# Patient Record
Sex: FEMALE | Race: BLACK OR AFRICAN AMERICAN | Employment: UNEMPLOYED | URBAN - METROPOLITAN AREA
[De-identification: names, ages, dates, MRNs, and addresses within clinical notes are randomized per-mention and may not be internally consistent; named-entity substitution may affect disease eponyms.]

---

## 2018-07-16 ENCOUNTER — OFFICE VISIT (OUTPATIENT)
Dept: URGENT CARE | Age: 23
End: 2018-07-16

## 2018-07-16 VITALS
WEIGHT: 113 LBS | BODY MASS INDEX: 22.78 KG/M2 | TEMPERATURE: 97.2 F | OXYGEN SATURATION: 98 % | HEART RATE: 87 BPM | RESPIRATION RATE: 18 BRPM | HEIGHT: 59 IN | DIASTOLIC BLOOD PRESSURE: 61 MMHG | SYSTOLIC BLOOD PRESSURE: 109 MMHG

## 2018-07-16 DIAGNOSIS — L50.9 HIVES: Primary | ICD-10-CM

## 2018-07-16 DIAGNOSIS — Z32.00 POSSIBLE PREGNANCY: ICD-10-CM

## 2018-07-16 LAB
HCG URINE, QL. (POC): NEGATIVE
VALID INTERNAL CONTROL?: YES

## 2018-07-16 RX ORDER — TRIAMCINOLONE ACETONIDE 1 MG/G
CREAM TOPICAL 2 TIMES DAILY
Qty: 45 G | Refills: 0 | Status: SHIPPED | OUTPATIENT
Start: 2018-07-16 | End: 2018-07-23

## 2018-07-16 NOTE — MR AVS SNAPSHOT
[address] [facility] [address] 
[phone] Patient: Pierre Taylor MRN: [id number] :1995 Visit Information Date & Time Provider Department Dept. Phone Encounter #  
 2018  2:00 PM [address] [facility] [phone] [id number] Upcoming Health Maintenance Date Due  
 HPV Age 9Y-34Y (1 of 1 - Female 3 Dose Series) 2004 DTaP/Tdap/Td series (1 - Tdap) 2014 PAP AKA CERVICAL CYTOLOGY 2014 Influenza Age 5 to Adult 2018 Allergies as of 2018  Review Complete On: 2018 By: Eliz Riggins MD  
 No Known Allergies Current Immunizations  Never Reviewed No immunizations on file. Not reviewed this visit You Were Diagnosed With   
  
 Codes Comments Hives    -  Primary ICD-10-CM: L50.9 ICD-9-CM: 708.9 Possible pregnancy     ICD-10-CM: Z32.00 ICD-9-CM: V72.40 Vitals BP Pulse Temp Resp Height(growth percentile) Weight(growth percentile) 109/61 87 97.2 °F (36.2 °C) 18 4' 11\" (1.499 m) 113 lb (51.3 kg) LMP SpO2 BMI OB Status Smoking Status 2018 98% 22.82 kg/m2 Having regular periods Never Smoker BMI and BSA Data Body Mass Index Body Surface Area  
 22.82 kg/m 2 1.46 m 2 Preferred Pharmacy Pharmacy Name Phone [id number] [address] [phone] Your Updated Medication List  
  
   
This list is accurate as of 18  2:14 PM.  Always use your most recent med list.  
  
  
  
  
 triamcinolone acetonide 0.1 % topical cream  
Commonly known as:  KENALOG Apply  to affected area two (2) times a day for 7 days. use thin layer Prescriptions Sent to Pharmacy Refills  
 triamcinolone acetonide (KENALOG) 0.1 % topical cream 0 Sig: Apply  to affected area two (2) times a day for 7 days. use thin layer  Class: Normal  
 Pharmacy: [facility]/pharmacy #[id number] - [address] #: [phone] Route: Topical  
  
We Performed the Following AMB POC URINE PREGNANCY TEST, VISUAL COLOR COMPARISON [28611 CPT(R)] Patient Instructions Hives: Care Instructions Your Care Instructions Hives are raised, red, itchy patches of skin. They are also called wheals or welts. They usually have red borders and pale centers. Hives range in size from ¼ inch to 3 inches or more across. They may seem to move from place to place on the skin. Several hives may form a large area of raised, red skin. You can get hives after an insect sting, after taking medicine or eating certain foods, or because of infection or stress. Other causes include plants, things you breathe in, makeup, heat, cold, sunlight, and latex. You cannot spread hives to other people. Hives may last a few minutes or a few days, but a single spot may last less than 36 hours. Follow-up care is a key part of your treatment and safety. Be sure to make and go to all appointments, and call your doctor if you are having problems. It's also a good idea to know your test results and keep a list of the medicines you take. How can you care for yourself at home? · Avoid whatever you think may have caused your hives, such as a certain food or medicine. However, you may not know the cause. · Put a cool, wet towel on the area to relieve itching. · Take an over-the-counter antihistamine, such as diphenhydramine (Benadryl), cetirizine (Zyrtec), or loratadine (Claritin), to help stop the hives and calm the itching. Read and follow directions on the label. These medicines can make you feel sleepy. Do not drive while using them. · Stay away from strong soaps, detergents, and chemicals. These can make itching worse. When should you call for help? Call 911 anytime you think you may need emergency care. For example, call if:   · You have symptoms of a severe allergic reaction. These may include: 
¨ Sudden raised, red areas (hives) all over your body. ¨ Swelling of the throat, mouth, lips, or tongue. ¨ Trouble breathing. ¨ Passing out (losing consciousness). Or you may feel very lightheaded or suddenly feel weak, confused, or restless.  
 Call your doctor now or seek immediate medical care if: 
  · You have symptoms of an allergic reaction, such as: ¨ A rash or hives (raised, red areas on the skin). ¨ Itching. ¨ Swelling. ¨ Belly pain, nausea, or vomiting.  
  · You get hives after you start a new medicine.  
  · Hives have not gone away after 24 hours.  
 Watch closely for changes in your health, and be sure to contact your doctor if: 
  · You do not get better as expected. Where can you learn more? Go to http://hudson-marietta.info/. Enter A791 in the search box to learn more about \"Hives: Care Instructions. \" Current as of: November 20, 2017 Content Version: 11.7 © 1380-2199 [employer]. Care instructions adapted under license by [employer] (which disclaims liability or warranty for this information). If you have questions about a medical condition or this instruction, always ask your healthcare professional. [address] any warranty or liability for your use of this information. Introducing [facility]! Jimmy Madison introduces [employer] patient portal. Now you can access parts of your medical record, email your doctor's office, and request medication refills online. 1. In your internet browser, go to [email or web address] 2. Click on the First Time User? Click Here link in the Sign In box. You will see the New Member Sign Up page. 3. Enter your [employer] Access Code exactly as it appears below. You will not need to use this code after youve completed the sign-up process.  If you do not sign up before the expiration date, you must request a new code. · [employer] Access Code: [email or web address] Expires: 10/14/2018  1:47 PM 
 
4. Enter the last four digits of your Social Security Number (xxxx) and Date of Birth (mm/dd/yyyy) as indicated and click Submit. You will be taken to the next sign-up page. 5. Create a [employer] ID. This will be your [employer] login ID and cannot be changed, so think of one that is secure and easy to remember. 6. Create a [employer] password. You can change your password at any time. 7. Enter your Password Reset Question and Answer. This can be used at a later time if you forget your password. 8. Enter your e-mail address. You will receive e-mail notification when new information is available in [address] 9. Click Sign Up. You can now view and download portions of your medical record. 10. Click the Download Summary menu link to download a portable copy of your medical information. If you have questions, please visit the Frequently Asked Questions section of the [employer] website. Remember, [employer] is NOT to be used for urgent needs. For medical emergencies, dial 911. Now available from your iPhone and Android! Please provide this summary of care documentation to your next provider. Your primary care clinician is listed as Anselmo Reddish. If you have any questions after today's visit, please call [phone].

## 2018-07-16 NOTE — PATIENT INSTRUCTIONS
Hives: Care Instructions  Your Care Instructions  Hives are raised, red, itchy patches of skin. They are also called wheals or welts. They usually have red borders and pale centers. Hives range in size from ¼ inch to 3 inches or more across. They may seem to move from place to place on the skin. Several hives may form a large area of raised, red skin. You can get hives after an insect sting, after taking medicine or eating certain foods, or because of infection or stress. Other causes include plants, things you breathe in, makeup, heat, cold, sunlight, and latex. You cannot spread hives to other people. Hives may last a few minutes or a few days, but a single spot may last less than 36 hours. Follow-up care is a key part of your treatment and safety. Be sure to make and go to all appointments, and call your doctor if you are having problems. It's also a good idea to know your test results and keep a list of the medicines you take. How can you care for yourself at home? · Avoid whatever you think may have caused your hives, such as a certain food or medicine. However, you may not know the cause. · Put a cool, wet towel on the area to relieve itching. · Take an over-the-counter antihistamine, such as diphenhydramine (Benadryl), cetirizine (Zyrtec), or loratadine (Claritin), to help stop the hives and calm the itching. Read and follow directions on the label. These medicines can make you feel sleepy. Do not drive while using them. · Stay away from strong soaps, detergents, and chemicals. These can make itching worse. When should you call for help? Call 911 anytime you think you may need emergency care. For example, call if:    · You have symptoms of a severe allergic reaction. These may include:  ¨ Sudden raised, red areas (hives) all over your body. ¨ Swelling of the throat, mouth, lips, or tongue. ¨ Trouble breathing. ¨ Passing out (losing consciousness).  Or you may feel very lightheaded or suddenly feel weak, confused, or restless.    Call your doctor now or seek immediate medical care if:    · You have symptoms of an allergic reaction, such as:  ¨ A rash or hives (raised, red areas on the skin). ¨ Itching. ¨ Swelling. ¨ Belly pain, nausea, or vomiting.     · You get hives after you start a new medicine.     · Hives have not gone away after 24 hours.    Watch closely for changes in your health, and be sure to contact your doctor if:    · You do not get better as expected. Where can you learn more? Go to http://hudson-marietta.info/. Enter J348 in the search box to learn more about \"Hives: Care Instructions. \"  Current as of: November 20, 2017  Content Version: 11.7  © 1601-4140 [employer]. Care instructions adapted under license by [employer] (which disclaims liability or warranty for this information). If you have questions about a medical condition or this instruction, always ask your healthcare professional. [address] any warranty or liability for your use of this information.

## 2018-07-16 NOTE — PROGRESS NOTES
Patient is a 23 y.o. female presenting with rash. Rash    The history is provided by the patient. This is a new problem. Episode onset: today, noticed itchy rash on face, trunk,  BUE; no known trigger; trying to get pregnant LMP 1 month ago. The problem has been gradually worsening. There has been no fever. The patient is experiencing no pain. Associated symptoms include itching. She has tried nothing for the symptoms. History reviewed. No pertinent past medical history. History reviewed. No pertinent surgical history. Family History   Problem Relation Age of Onset    Family history unknown: Yes        Social History     Social History    Marital status: UNKNOWN     Spouse name: N/A    Number of children: N/A    Years of education: N/A     Occupational History    Not on file. Social History Main Topics    Smoking status: Never Smoker    Smokeless tobacco: Not on file    Alcohol use No    Drug use: No    Sexual activity: Not on file     Other Topics Concern    Not on file     Social History Narrative    ** Merged History Encounter **                     ALLERGIES: Review of patient's allergies indicates no known allergies. Review of Systems   Constitutional: Negative for chills and fever. Respiratory: Negative for shortness of breath and wheezing. Cardiovascular: Negative for chest pain and palpitations. Musculoskeletal: Negative for myalgias. Skin: Positive for itching and rash. Hematological: Negative for adenopathy. Vitals:    07/16/18 1359   BP: 109/61   Pulse: 87   Resp: 18   Temp: 97.2 °F (36.2 °C)   SpO2: 98%   Weight: 113 lb (51.3 kg)   Height: 4' 11\" (1.499 m)       Physical Exam   Constitutional: She appears well-developed and well-nourished. No distress. Neurological: She is alert. Skin: Rash (Chest, right face, BUE: scattered raised erythematous lesions c/w hives; non-ttp) noted. She is not diaphoretic.    Psychiatric: She has a normal mood and affect. Her behavior is normal. Judgment and thought content normal.   Nursing note and vitals reviewed. MDM    Procedures        ICD-10-CM ICD-9-CM    1. Hives L50.9 708.9    2. Possible pregnancy Z32.00 V72.40 AMB POC URINE PREGNANCY TEST, VISUAL COLOR COMPARISON     Medications Ordered Today   Medications    triamcinolone acetonide (KENALOG) 0.1 % topical cream     Sig: Apply  to affected area two (2) times a day for 7 days. use thin layer     Dispense:  45 g     Refill:  0     Recheck UPT in 2 weeks  Avoid steroid cream near eyes      The patients condition was discussed with the patient and they understand. The patient is to follow up with PCP. If signs and symptoms become worse the pt is to go to the ER. The patient is to take medications as prescribed.

## 2018-07-19 ENCOUNTER — HOSPITAL ENCOUNTER (EMERGENCY)
Age: 23
Discharge: HOME | End: 2018-07-19
Attending: EMERGENCY MEDICINE
Payer: COMMERCIAL

## 2018-07-19 VITALS
HEIGHT: 59 IN | OXYGEN SATURATION: 100 % | HEART RATE: 77 BPM | DIASTOLIC BLOOD PRESSURE: 71 MMHG | BODY MASS INDEX: 23.43 KG/M2 | TEMPERATURE: 98.7 F | WEIGHT: 116.2 LBS | SYSTOLIC BLOOD PRESSURE: 106 MMHG | RESPIRATION RATE: 16 BRPM

## 2018-07-19 DIAGNOSIS — T78.40XA ALLERGIC REACTION, INITIAL ENCOUNTER: Primary | ICD-10-CM

## 2018-07-19 PROCEDURE — 96375 TX/PRO/DX INJ NEW DRUG ADDON: CPT

## 2018-07-19 PROCEDURE — 96361 HYDRATE IV INFUSION ADD-ON: CPT

## 2018-07-19 PROCEDURE — 99283 EMERGENCY DEPT VISIT LOW MDM: CPT

## 2018-07-19 PROCEDURE — 74011000250 HC RX REV CODE- 250: Performed by: EMERGENCY MEDICINE

## 2018-07-19 PROCEDURE — 74011250636 HC RX REV CODE- 250/636: Performed by: EMERGENCY MEDICINE

## 2018-07-19 PROCEDURE — 96374 THER/PROPH/DIAG INJ IV PUSH: CPT

## 2018-07-19 RX ORDER — DIPHENHYDRAMINE HCL 25 MG
50 TABLET ORAL
Qty: 30 TAB | Refills: 0 | Status: SHIPPED | OUTPATIENT
Start: 2018-07-19 | End: 2018-08-24

## 2018-07-19 RX ORDER — FAMOTIDINE 20 MG/1
20 TABLET, FILM COATED ORAL
Qty: 10 TAB | Refills: 0 | Status: SHIPPED | OUTPATIENT
Start: 2018-07-19 | End: 2018-07-29

## 2018-07-19 RX ORDER — PREDNISONE 20 MG/1
60 TABLET ORAL DAILY
Qty: 21 TAB | Refills: 0 | Status: SHIPPED | OUTPATIENT
Start: 2018-07-19 | End: 2018-07-26

## 2018-07-19 RX ORDER — DIPHENHYDRAMINE HYDROCHLORIDE 50 MG/ML
50 INJECTION, SOLUTION INTRAMUSCULAR; INTRAVENOUS
Status: COMPLETED | OUTPATIENT
Start: 2018-07-19 | End: 2018-07-19

## 2018-07-19 RX ADMIN — SODIUM CHLORIDE 1000 ML: 900 INJECTION, SOLUTION INTRAVENOUS at 22:26

## 2018-07-19 RX ADMIN — METHYLPREDNISOLONE SODIUM SUCCINATE 125 MG: 40 INJECTION, POWDER, FOR SOLUTION INTRAMUSCULAR; INTRAVENOUS at 22:34

## 2018-07-19 RX ADMIN — DIPHENHYDRAMINE HYDROCHLORIDE 50 MG: 50 INJECTION, SOLUTION INTRAMUSCULAR; INTRAVENOUS at 22:27

## 2018-07-19 RX ADMIN — FAMOTIDINE 20 MG: 10 INJECTION, SOLUTION INTRAVENOUS at 22:26

## 2018-07-20 NOTE — DISCHARGE INSTRUCTIONS
Allergic Reaction: Care Instructions  Your Care Instructions    An allergic reaction is an excessive response from your immune system to a medicine, chemical, food, insect bite, or other substance. A reaction can range from mild to life-threatening. Some people have a mild rash, hives, and itching or stomach cramps. In severe reactions, swelling of your tongue and throat can close up your airway so that you cannot breathe. Follow-up care is a key part of your treatment and safety. Be sure to make and go to all appointments, and call your doctor if you are having problems. It's also a good idea to know your test results and keep a list of the medicines you take. How can you care for yourself at home? · If you know what caused your allergic reaction, be sure to avoid it. Your allergy may become more severe each time you have a reaction. · Take an over-the-counter antihistamine, such as cetirizine (Zyrtec) or loratadine (Claritin), to treat mild symptoms. Read and follow directions on the label. Some antihistamines can make you feel sleepy. Do not give antihistamines to a child unless you have checked with your doctor first. Mild symptoms include sneezing or an itchy or runny nose; an itchy mouth; a few hives or mild itching; and mild nausea or stomach discomfort. · Do not scratch hives or a rash. Put a cold, moist towel on them or take cool baths to relieve itching. Put ice packs on hives, swelling, or insect stings for 10 to 15 minutes at a time. Put a thin cloth between the ice pack and your skin. Do not take hot baths or showers. They will make the itching worse. · Your doctor may prescribe a shot of epinephrine to carry with you in case you have a severe reaction. Learn how to give yourself the shot and keep it with you at all times. Make sure it is not . · Go to the emergency room every time you have a severe reaction, even if you have used your shot of epinephrine and are feeling better. Symptoms can come back after a shot. · Wear medical alert jewelry that lists your allergies. You can buy this at most drugstores. · If your child has a severe allergy, make sure that his or her teachers, babysitters, coaches, and other caregivers know about the allergy. They should have an epinephrine shot, know how and when to give it, and have a plan to take your child to the hospital.  When should you call for help? Give an epinephrine shot if:    · You think you are having a severe allergic reaction.     · You have symptoms in more than one body area, such as mild nausea and an itchy mouth.    After giving an epinephrine shot call 911, even if you feel better.   Call 911 if:    · You have symptoms of a severe allergic reaction. These may include:  ¨ Sudden raised, red areas (hives) all over your body. ¨ Swelling of the throat, mouth, lips, or tongue. ¨ Trouble breathing. ¨ Passing out (losing consciousness). Or you may feel very lightheaded or suddenly feel weak, confused, or restless.     · You have been given an epinephrine shot, even if you feel better.    Call your doctor now or seek immediate medical care if:    · You have symptoms of an allergic reaction, such as:  ¨ A rash or hives (raised, red areas on the skin). ¨ Itching. ¨ Swelling. ¨ Belly pain, nausea, or vomiting.    Watch closely for changes in your health, and be sure to contact your doctor if:    · You do not get better as expected. Where can you learn more? Go to http://hudson-marietta.info/. Enter Z825 in the search box to learn more about \"Allergic Reaction: Care Instructions. \"  Current as of: October 6, 2017  Content Version: 11.7  © 1596-4825 [employer]. Care instructions adapted under license by [employer] (which disclaims liability or warranty for this information).  If you have questions about a medical condition or this instruction, always ask your healthcare professional. [employer] disclaims any warranty or liability for your use of this information.

## 2018-07-20 NOTE — ED NOTES
RN reviewed discharge instructions using teachback method. Pt ambulated to exit without difficulty and in no signs of acute distress escorted by friend who will drive home. No complaints or needs expressed at this time. Patient was counseled on medications prescribed at discharge. VSS at time of discharge. Pt to call PCP in the morning for follow up.

## 2018-07-20 NOTE — ED PROVIDER NOTES
HPI Comments: Patient is a 23year old female with a past medical history signficant for Asthma who presents ambulatory to the ED c/o an allergic reaction onset 4 days ago. Pt reports that she started out with a generalized rash all over her body but now she is having burning and slight facial swelling. She currently rates her pain at 8/10 in severity. She also c/o itching all over her body. Pt denies fever,chills, n/v, chest pain, cough, shortness of breath or any other acute sx. Of note, pt went to see Urgent Care on Monday and was seen at [facility] on Wednesday (prescribed steroid cream and told to take Benadryl). She denies any relief with this regimen. Pt is not a smoker, denies EtOH/drug use. There are no additional medical complaints at this time. Signed by: wolf Rodriguez for Troy Schneider MD on July 19th, 2018 at 22:08 pm.        The history is provided by the patient. No  was used. Past Medical History:   Diagnosis Date    Asthma        History reviewed. No pertinent surgical history. Family History:   Problem Relation Age of Onset    Family history unknown: Yes       Social History     Social History    Marital status: UNKNOWN     Spouse name: N/A    Number of children: N/A    Years of education: N/A     Occupational History    Not on file. Social History Main Topics    Smoking status: Never Smoker    Smokeless tobacco: Not on file    Alcohol use No    Drug use: No    Sexual activity: Not on file     Other Topics Concern    Not on file     Social History Narrative    ** Merged History Encounter **              ALLERGIES: Review of patient's allergies indicates no known allergies. Review of Systems   Constitutional: Negative for activity change, chills and fever. HENT: Negative for congestion and sore throat. Eyes: Negative for photophobia and visual disturbance. Respiratory: Negative for cough and shortness of breath. Cardiovascular: Negative for chest pain, palpitations and leg swelling. Gastrointestinal: Negative for abdominal pain, blood in stool, constipation, diarrhea, nausea and vomiting. Genitourinary: Negative for dysuria and hematuria. Musculoskeletal: Negative for back pain and neck pain. Skin: Positive for color change and rash. Positive for itching, hives   Neurological: Positive for dizziness. Negative for numbness and headaches. All other systems reviewed and are negative. Vitals:    07/19/18 2131   BP: 110/73   Pulse: 81   Resp: 16   Temp: 98.4 °F (36.9 °C)   SpO2: 100%   Weight: 52.7 kg (116 lb 3.2 oz)   Height: 4' 11\" (1.499 m)            Physical Exam   Nursing note and vitals reviewed. CONSTITUTIONAL: Well-appearing; well-nourished; in no apparent distress  HEAD: Normocephalic; atraumatic  EYES: PERRL; EOM intact; conjunctiva and sclera are clear bilaterally. ENT: No rhinorrhea; normal pharynx with no tonsillar hypertrophy; mucous membranes pink/moist, no erythema, no exudate. NECK: Supple; non-tender; no cervical lymphadenopathy  CARD: Normal S1, S2; no murmurs, rubs, or gallops. Regular rate and rhythm. RESP: Normal respiratory effort; breath sounds clear and equal bilaterally; no wheezes, rhonchi, or rales. ABD: Normal bowel sounds; non-distended; non-tender; no palpable organomegaly, no masses, no bruits. Back Exam: Normal inspection; no vertebral point tenderness, no CVA tenderness. Normal range of motion. EXT: Normal ROM in all four extremities; non-tender to palpation; no swelling or deformity; distal pulses are normal, no edema. SKIN: Warm; dry; scattered hives.   NEURO:Alert and oriented x 3, coherent, CYNTHIA-XII grossly intact, sensory and motor are non-focal.        MDM  Number of Diagnoses or Management Options  Allergic reaction, initial encounter:   Diagnosis management comments: Assessment: 24-year-old female with hives- Allergic reaction/ Urticaria      Plan: Solu-Medrol/ Benadryl/ Pepcid/ serial exam / Monitor and Reevaluate. Amount and/or Complexity of Data Reviewed  Clinical lab tests: reviewed and ordered  Tests in the radiology section of CPT®: ordered and reviewed  Tests in the medicine section of CPT®: reviewed and ordered  Discussion of test results with the performing providers: yes  Decide to obtain previous medical records or to obtain history from someone other than the patient: yes  Obtain history from someone other than the patient: yes  Review and summarize past medical records: yes  Independent visualization of images, tracings, or specimens: yes    Risk of Complications, Morbidity, and/or Mortality  Presenting problems: moderate  Diagnostic procedures: moderate  Management options: moderate          ED Course       Procedures     Progress Note:   Pt has been reexamined by Codi Dior MD. Pt is feeling much better. Symptoms have improved. All available results have been reviewed with pt and any available family. Pt understands sx, dx, and tx in ED. Care plan has been outlined and questions have been answered. Pt is ready to go home. Will send home on Allergic reaction instruction. Prescription of prednisone, Pepcid, and Benadryl. Outpatient referral with PCP as needed. Written by Codi Dior MD,4:08 AM    .   .

## 2018-07-20 NOTE — ED TRIAGE NOTES
Patient arrives to ED with c/o a possible allergic reaction since Monday, patient states she has been \"in and out of ERs since Monday, patient started out with a generalized rash, which has gotten better, now patient c/o slight swelling to face and generalized itching.

## 2018-08-23 ENCOUNTER — HOSPITAL ENCOUNTER (EMERGENCY)
Age: 23
Discharge: HOME | End: 2018-08-24
Attending: EMERGENCY MEDICINE
Payer: COMMERCIAL

## 2018-08-23 VITALS
HEIGHT: 59 IN | OXYGEN SATURATION: 97 % | SYSTOLIC BLOOD PRESSURE: 106 MMHG | RESPIRATION RATE: 16 BRPM | HEART RATE: 89 BPM | TEMPERATURE: 98.2 F | DIASTOLIC BLOOD PRESSURE: 74 MMHG

## 2018-08-23 DIAGNOSIS — R21 RASH AND NONSPECIFIC SKIN ERUPTION: Primary | ICD-10-CM

## 2018-08-23 PROCEDURE — 99283 EMERGENCY DEPT VISIT LOW MDM: CPT

## 2018-08-24 PROCEDURE — 74011636637 HC RX REV CODE- 636/637: Performed by: PHYSICIAN ASSISTANT

## 2018-08-24 PROCEDURE — 74011250637 HC RX REV CODE- 250/637: Performed by: PHYSICIAN ASSISTANT

## 2018-08-24 RX ORDER — PREDNISONE 20 MG/1
TABLET ORAL
Qty: 20 TAB | Refills: 0 | Status: SHIPPED | OUTPATIENT
Start: 2018-08-24 | End: 2018-10-02

## 2018-08-24 RX ORDER — HYDROXYZINE 50 MG/1
50 TABLET, FILM COATED ORAL
Status: COMPLETED | OUTPATIENT
Start: 2018-08-24 | End: 2018-08-24

## 2018-08-24 RX ORDER — PREDNISONE 20 MG/1
60 TABLET ORAL
Status: COMPLETED | OUTPATIENT
Start: 2018-08-24 | End: 2018-08-24

## 2018-08-24 RX ORDER — HYDROXYZINE 50 MG/1
50 TABLET, FILM COATED ORAL
Qty: 20 TAB | Refills: 0 | Status: SHIPPED | OUTPATIENT
Start: 2018-08-24 | End: 2018-09-03

## 2018-08-24 RX ORDER — DIPHENHYDRAMINE HCL 25 MG
25-50 CAPSULE ORAL
Qty: 30 CAP | Refills: 0 | Status: SHIPPED | OUTPATIENT
Start: 2018-08-24 | End: 2018-10-02

## 2018-08-24 RX ADMIN — PREDNISONE 60 MG: 20 TABLET ORAL at 00:28

## 2018-08-24 RX ADMIN — HYDROXYZINE HYDROCHLORIDE 50 MG: 50 TABLET, FILM COATED ORAL at 00:28

## 2018-08-24 NOTE — ED TRIAGE NOTES
Arrived from home, reporting rashes all over her body, with puritis for \"a while,\" possibly 2 weeks ago. Patient went to urgent care, given benadryl and some topical cream. No relief on symptoms. Reports taking children's benadryl around 2000.

## 2018-08-24 NOTE — DISCHARGE INSTRUCTIONS

## 2018-09-20 ENCOUNTER — OFFICE VISIT (OUTPATIENT)
Dept: URGENT CARE | Age: 23
End: 2018-09-20

## 2018-09-20 VITALS
TEMPERATURE: 97.8 F | RESPIRATION RATE: 16 BRPM | WEIGHT: 114.4 LBS | SYSTOLIC BLOOD PRESSURE: 110 MMHG | HEART RATE: 81 BPM | BODY MASS INDEX: 23.06 KG/M2 | OXYGEN SATURATION: 99 % | HEIGHT: 59 IN | DIASTOLIC BLOOD PRESSURE: 70 MMHG

## 2018-09-20 DIAGNOSIS — M25.512 ACUTE PAIN OF LEFT SHOULDER: ICD-10-CM

## 2018-09-20 DIAGNOSIS — M25.561 ACUTE PAIN OF RIGHT KNEE: ICD-10-CM
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
## 2018-09-20 DIAGNOSIS — R07.81 RIB PAIN: ICD-10-CM

## 2018-09-20 RX ORDER — NAPROXEN 375 MG/1
375 TABLET ORAL 2 TIMES DAILY WITH MEALS
Qty: 14 TAB | Refills: 0 | Status: SHIPPED | OUTPATIENT
Start: 2018-09-20 | End: 2018-09-27
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
## 2018-10-02 ENCOUNTER — OFFICE VISIT (OUTPATIENT)
Dept: INTERNAL MEDICINE CLINIC | Age: 23
End: 2018-10-02

## 2018-10-02 VITALS
HEART RATE: 98 BPM | DIASTOLIC BLOOD PRESSURE: 66 MMHG | HEIGHT: 59 IN | SYSTOLIC BLOOD PRESSURE: 110 MMHG | WEIGHT: 113.25 LBS | RESPIRATION RATE: 16 BRPM | BODY MASS INDEX: 22.83 KG/M2 | TEMPERATURE: 98.8 F | OXYGEN SATURATION: 99 %

## 2018-10-02 DIAGNOSIS — H53.8 BLURRY VISION, BILATERAL: ICD-10-CM

## 2018-10-02 DIAGNOSIS — Z76.89 ENCOUNTER TO ESTABLISH CARE: ICD-10-CM

## 2018-10-02 DIAGNOSIS — M25.561 RIGHT KNEE PAIN, UNSPECIFIED CHRONICITY: ICD-10-CM

## 2018-10-02 DIAGNOSIS — R93.7 ABNORMAL CT SCAN, LUMBAR SPINE: ICD-10-CM

## 2018-10-02 DIAGNOSIS — N28.1 RENAL CYST, LEFT: Primary | ICD-10-CM

## 2018-10-02 DIAGNOSIS — M25.551 RIGHT HIP PAIN: ICD-10-CM

## 2018-10-02 NOTE — PATIENT INSTRUCTIONS
1.  Take the ED notes to your ortho visit today. Dr. Garrett Hill can review the knee imaging reports, CT scan reports and do other imaging/management of your right knee/hip/ankle pain at that visit. 2.  You can call [phone] for Central Scheduling. They can help you schedule the ultrasound. You will typically be called in 2-3 business days to schedule. If you have problems scheduling, please call here to speak with someone in our clinic. Please clarify with them if you need to bring a copy of the CT from [address] [place] [address] Emergency Room with you to compare. 3.  Please follow the following instructions to process/authorize your referral, if needed: 
 
Referrals processing Please verify with your insurance IF you need referral authorization submitted. For insurance plans which require this, please follow the following steps. FAILURE TO DO SO MAY RESULT IN INABILITY TO SEE THE SPECIALIST YOU HAVE BEEN REFERRED TO (once you are scheduled to see them). 1. Call and schedule appointment with specialist 
2. Call our clinic and leave message with provider name, and date of appointment 3. We will then submit the referral to your insurance. This process takes 2-5 business days. If you have questions about scheduling or authorizing referral, you can review with our referral coordinators Fidel Crisostomo. or Yaz Morton) at the . You can review with them today if available/if you have time, or you can call to review with them once you have made your referral/appointment. If you are not sure if you need referral authorizations, please review with the referral coordinators, either prior to or after you have made the appointment, as reviewed.
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
## 2018-10-02 NOTE — MR AVS SNAPSHOT
[address] [place] [address] [place] [address] 
[phone] Patient: Conchis Phillip MRN: [id number] :1995 Visit Information Date & Time Provider Department Dept. Phone Encounter #  
 10/2/2018 11:45 AM Mihai Dao, [address] [place] [address] and Internal Medicine [phone] [id number] Follow-up Instructions Return in about 3 months (around 2019), or if symptoms worsen or fail to improve, for yearly physical, fasting labs. Upcoming Health Maintenance Date Due  
 HPV Age 9Y-34Y (1 of 1 - Female 3 Dose Series) 2004 DTaP/Tdap/Td series (1 - Tdap) 2014 PAP AKA CERVICAL CYTOLOGY 2014 Influenza Age 5 to Adult 2019* *Topic was postponed. The date shown is not the original due date. Allergies as of 10/2/2018  Review Complete On: 10/2/2018 By: Mihai Dao MD  
  
 Severity Noted Reaction Type Reactions Grass Pollen  10/02/2018    Sneezing Mold  10/02/2018    Sneezing Tree And Shrub Pollen  10/02/2018    Sneezing Weed Pollen  10/02/2018    Hives, Sneezing Current Immunizations  Never Reviewed No immunizations on file. Not reviewed this visit You Were Diagnosed With   
  
 Codes Comments Renal cyst, left    -  Primary ICD-10-CM: N28.1 ICD-9-CM: 753.10 Abnormal CT scan, lumbar spine     ICD-10-CM: R93.7 ICD-9-CM: 793.7 Right hip pain     ICD-10-CM: M25.551 ICD-9-CM: 719.45 Right knee pain, unspecified chronicity     ICD-10-CM: M25.561 ICD-9-CM: 719.46 Blurry vision, bilateral     ICD-10-CM: H53.8 ICD-9-CM: 368.8 Encounter to establish care     ICD-10-CM: Z76.89 
ICD-9-CM: V65.8 Vitals BP Pulse Temp Resp Height(growth percentile) Weight(growth percentile) 110/66 (BP 1 Location: Left arm, BP Patient Position: Sitting) 98 98.8 °F (37.1 °C) (Oral) 16 4' 11\" (1.499 m) 113 lb 4 oz (51.4 kg) LMP SpO2 BMI OB Status Smoking Status 09/04/2018 (Exact Date) 99% 22.87 kg/m2 Having regular periods Never Smoker BMI and BSA Data Body Mass Index Body Surface Area  
 22.87 kg/m 2 1.46 m 2 Preferred Pharmacy Pharmacy Name Phone [id number] [phone] Your Updated Medication List  
  
Notice  As of 10/2/2018 12:54 PM  
 You have not been prescribed any medications. We Performed the Following AMB POC VISUAL ACUITY SCREEN [98894 CPT(R)] REFERRAL TO OPHTHALMOLOGY [REF57 Custom] Comments:  
 Please evaluate for vision testing--blurry vision with driving. Visual Acuity in Right Eye - Without correction: 20/20. Visual Acuity in Left Eye - Without correction: 20/20. Visual Acuity in Both Eyes - Without correction: 20/20. Follow-up Instructions Return in about 3 months (around 1/2/2019), or if symptoms worsen or fail to improve, for yearly physical, fasting labs. To-Do List   
 10/07/2018 Imaging:  US RETROPERITONEUM COMP Referral Information Referral ID Referred By Referred To  
  
 [id number] Cody Bailey MD   
   [address] Phone: [phone] Fax: [phone] Visits Status Start Date End Date 1 New Request 10/2/18 10/2/19 If your referral has a status of pending review or denied, additional information will be sent to support the outcome of this decision. Patient Instructions 1. Take the ED notes to your ortho visit today. Dr. Leonel Yen can review the knee imaging reports, CT scan reports and do other imaging/management of your right knee/hip/ankle pain at that visit. 2.  You can call [phone] for Central Scheduling. They can help you schedule the ultrasound.   You will typically be called in 2-3 business days to schedule. If you have problems scheduling, please call here to speak with someone in our clinic. Please clarify with them if you need to bring a copy of the CT from [facility] Emergency Room with you to compare. 3.  Please follow the following instructions to process/authorize your referral, if needed: 
 
Referrals processing Please verify with your insurance IF you need referral authorization submitted. For insurance plans which require this, please follow the following steps. FAILURE TO DO SO MAY RESULT IN INABILITY TO SEE THE SPECIALIST YOU HAVE BEEN REFERRED TO (once you are scheduled to see them). 1. Call and schedule appointment with specialist 
2. Call our clinic and leave message with provider name, and date of appointment 3. We will then submit the referral to your insurance. This process takes 2-5 business days. If you have questions about scheduling or authorizing referral, you can review with our referral coordinators Cassie Raphael or Albino Combs) at the . You can review with them today if available/if you have time, or you can call to review with them once you have made your referral/appointment. If you are not sure if you need referral authorizations, please review with the referral coordinators, either prior to or after you have made the appointment, as reviewed. Introducing \[facility]\"" & HEALTH SERVICES! [employer] introduces REHAPP patient portal. Now you can access parts of your medical record, email your doctor's office, and request medication refills online. 1. In your internet browser, go to [email or web address] 2. Click on the First Time User? Click Here link in the Sign In box. You will see the New Member Sign Up page. 3. Enter your REHAPP Access Code exactly as it appears below. You will not need to use this code after youve completed the sign-up process.  If you do not sign up before the expiration date, you must request a new code. 
 
· Outsell Access Code: [email or web address] Expires: 10/14/2018  1:47 PM 
 
4. Enter the last four digits of your Social Security Number (xxxx) and Date of Birth (mm/dd/yyyy) as indicated and click Submit. You will be taken to the next sign-up page. 5. Create a Outsell ID. This will be your Outsell login ID and cannot be changed, so think of one that is secure and easy to remember. 6. Create a Outsell password. You can change your password at any time. 7. Enter your Password Reset Question and Answer. This can be used at a later time if you forget your password. 8. Enter your e-mail address. You will receive e-mail notification when new information is available in [address] 9. Click Sign Up. You can now view and download portions of your medical record. 10. Click the Download Summary menu link to download a portable copy of your medical information. If you have questions, please visit the Frequently Asked Questions section of the Outsell website. Remember, Outsell is NOT to be used for urgent needs. For medical emergencies, dial 911. Now available from your iPhone and Android! Please provide this summary of care documentation to your next provider. Your primary care clinician is listed as Sejal Hood. If you have any questions after today's visit, please call [phone].
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
## 2018-10-09 ENCOUNTER — HOSPITAL ENCOUNTER (OUTPATIENT)
Dept: ULTRASOUND IMAGING | Age: 23
Discharge: HOME | End: 2018-10-09
Attending: INTERNAL MEDICINE
Payer: COMMERCIAL

## 2018-10-09 DIAGNOSIS — N28.1 RENAL CYST, LEFT: ICD-10-CM

## 2018-10-09 PROCEDURE — 76770 US EXAM ABDO BACK WALL COMP: CPT

## 2020-01-05 ENCOUNTER — HOSPITAL ENCOUNTER (EMERGENCY)
Age: 25
Discharge: HOME | End: 2020-01-05
Attending: EMERGENCY MEDICINE
Payer: MEDICAID

## 2020-01-05 VITALS
SYSTOLIC BLOOD PRESSURE: 112 MMHG | BODY MASS INDEX: 23.59 KG/M2 | HEART RATE: 98 BPM | RESPIRATION RATE: 16 BRPM | WEIGHT: 117 LBS | DIASTOLIC BLOOD PRESSURE: 71 MMHG | TEMPERATURE: 100.6 F | HEIGHT: 59 IN | OXYGEN SATURATION: 100 %

## 2020-01-05 DIAGNOSIS — R50.9 ACUTE FEBRILE ILLNESS: Primary | ICD-10-CM

## 2020-01-05 DIAGNOSIS — J11.1 INFLUENZA-LIKE SYNDROME: ICD-10-CM

## 2020-01-05 PROCEDURE — 96372 THER/PROPH/DIAG INJ SC/IM: CPT

## 2020-01-05 PROCEDURE — 74011250636 HC RX REV CODE- 250/636: Performed by: EMERGENCY MEDICINE

## 2020-01-05 PROCEDURE — 74011250637 HC RX REV CODE- 250/637: Performed by: EMERGENCY MEDICINE

## 2020-01-05 PROCEDURE — 99283 EMERGENCY DEPT VISIT LOW MDM: CPT

## 2020-01-05 RX ORDER — ACETAMINOPHEN 500 MG
1000 TABLET ORAL
Status: COMPLETED | OUTPATIENT
Start: 2020-01-05 | End: 2020-01-05

## 2020-01-05 RX ORDER — KETOROLAC TROMETHAMINE 30 MG/ML
30 INJECTION, SOLUTION INTRAMUSCULAR; INTRAVENOUS
Status: COMPLETED | OUTPATIENT
Start: 2020-01-05 | End: 2020-01-05

## 2020-01-05 RX ADMIN — KETOROLAC TROMETHAMINE 30 MG: 30 INJECTION, SOLUTION INTRAMUSCULAR at 21:06

## 2020-01-05 RX ADMIN — ACETAMINOPHEN 1000 MG: 500 TABLET ORAL at 21:06

## 2020-01-06 NOTE — ED TRIAGE NOTES
Patient arrives with cough and congestion since Wednesday. Reports she went to [facility] Friday and was diagnosed with a virus. Patient has been taking azithromycin, steroids, and one other medication. Patient reports she has not felt better. Patient continues to state that this morning she went to [facility] Drs. \"they gave me some Toradol, they took some blood, and they did a CT scan on my chest, all of that was negative, they gave me a breathing treatment and sent me home. \"

## 2020-01-06 NOTE — DISCHARGE INSTRUCTIONS
Patient Education        Learning About Fever  What is a fever? A fever is a high body temperature. It's one way your body fights being sick. A fever shows that the body is responding to infection or other illnesses, both minor and severe. A fever is a symptom, not an illness by itself. A fever can be a sign that you are ill, but most fevers are not caused by a serious problem. You may have a fever with a minor illness, such as a cold. But sometimes a very serious infection may cause little or no fever. It is important to look at other symptoms, other conditions you have, and how you feel in general. In children, notice how they act and see what symptoms they complain of. What is a normal body temperature? A normal body temperature is about 98. 6ºF. Some people have a normal temperature that is a little higher or a little lower than this. Your temperature may be a little lower in the morning than it is later in the day. It may go up during hot weather or when you exercise, wear heavy clothes, or take a hot bath. Your temperature may also be different depending on how you take it. A temperature taken in the mouth (oral) or under the arm may be a little lower than your core temperature (rectal). What is a fever temperature? A core temperature of 100.4°F or above is considered a fever. What can cause a fever? A fever may be caused by:  · Infections. This is the most common cause of a fever. Examples of infections that can cause a fever include the flu, a kidney infection, or pneumonia. · Some medicines. · Severe trauma or injury, such as a heart attack, stroke, heatstroke, or burns. · Other medical conditions, such as arthritis and some cancers. How can you treat a fever at home? · Ask your doctor if you can take an over-the-counter pain medicine, such as acetaminophen (Tylenol), ibuprofen (Advil, Motrin), or naproxen (Aleve). Be safe with medicines. Read and follow all instructions on the label.   · To prevent dehydration, drink plenty of fluids. Choose water and other caffeine-free clear liquids until you feel better. If you have kidney, heart, or liver disease and have to limit fluids, talk with your doctor before you increase the amount of fluids you drink. Follow-up care is a key part of your treatment and safety. Be sure to make and go to all appointments, and call your doctor if you are having problems. It's also a good idea to know your test results and keep a list of the medicines you take. Where can you learn more? Go to http://hudson-marietta.info/. Enter N111 in the search box to learn more about \"Learning About Fever. \"  Current as of: June 26, 2019  Content Version: 12.2  © 3508-6712 [employer]. Care instructions adapted under license by [employer] (which disclaims liability or warranty for this information). If you have questions about a medical condition or this instruction, always ask your healthcare professional. [place] [address] any warranty or liability for your use of this information. Patient Education        Influenza (Flu): Care Instructions  Your Care Instructions    Influenza (flu) is an infection in the lungs and breathing passages. It is caused by the influenza virus. There are different strains, or types, of the flu virus from year to year. Unlike the common cold, the flu comes on suddenly and the symptoms, such as a cough, congestion, fever, chills, fatigue, aches, and pains, are more severe. These symptoms may last up to 10 days. Although the flu can make you feel very sick, it usually doesn't cause serious health problems. Home treatment is usually all you need for flu symptoms. But your doctor may prescribe antiviral medicine to prevent other health problems, such as pneumonia, from developing.  Older people and those who have a long-term health condition, such as lung disease, are most at risk for having pneumonia or other health problems. Follow-up care is a key part of your treatment and safety. Be sure to make and go to all appointments, and call your doctor if you are having problems. It's also a good idea to know your test results and keep a list of the medicines you take. How can you care for yourself at home? · Get plenty of rest.  · Drink plenty of fluids, enough so that your urine is light yellow or clear like water. If you have kidney, heart, or liver disease and have to limit fluids, talk with your doctor before you increase the amount of fluids you drink. · Take an over-the-counter pain medicine if needed, such as acetaminophen (Tylenol), ibuprofen (Advil, Motrin), or naproxen (Aleve), to relieve fever, headache, and muscle aches. Read and follow all instructions on the label. No one younger than 20 should take aspirin. It has been linked to Reye syndrome, a serious illness. · Do not smoke. Smoking can make the flu worse. If you need help quitting, talk to your doctor about stop-smoking programs and medicines. These can increase your chances of quitting for good. · Breathe moist air from a hot shower or from a sink filled with hot water to help clear a stuffy nose. · Before you use cough and cold medicines, check the label. These medicines may not be safe for young children or for people with certain health problems. · If the skin around your nose and lips becomes sore, put some petroleum jelly on the area. · To ease coughing:  ? Drink fluids to soothe a scratchy throat. ? Suck on cough drops or plain hard candy. ? Take an over-the-counter cough medicine that contains dextromethorphan to help you get some sleep. Read and follow all instructions on the label. ? Raise your head at night with an extra pillow. This may help you rest if coughing keeps you awake. · Take any prescribed medicine exactly as directed. Call your doctor if you think you are having a problem with your medicine.   To avoid spreading the flu  · Wash your hands regularly, and keep your hands away from your face. · Stay home from school, work, and other public places until you are feeling better and your fever has been gone for at least 24 hours. The fever needs to have gone away on its own without the help of medicine. · Ask people living with you to talk to their doctors about preventing the flu. They may get antiviral medicine to keep from getting the flu from you. · To prevent the flu in the future, get a flu vaccine every fall. Encourage people living with you to get the vaccine. · Cover your mouth when you cough or sneeze. When should you call for help? Call 911 anytime you think you may need emergency care. For example, call if:    · You have severe trouble breathing.    Call your doctor now or seek immediate medical care if:    · You have new or worse trouble breathing.     · You seem to be getting much sicker.     · You feel very sleepy or confused.     · You have a new or higher fever.     · You get a new rash.    Watch closely for changes in your health, and be sure to contact your doctor if:    · You begin to get better and then get worse.     · You are not getting better after 1 week. Where can you learn more? Go to http://hudson-marietta.info/. Enter R804 in the search box to learn more about \"Influenza (Flu): Care Instructions. \"  Current as of: June 9, 2019  Content Version: 12.2  © 5869-9573 [employer]. Care instructions adapted under license by [employer] (which disclaims liability or warranty for this information). If you have questions about a medical condition or this instruction, always ask your healthcare professional. [address] any warranty or liability for your use of this information.

## 2020-01-06 NOTE — ED PROVIDER NOTES
The patient is a 57-year-old female with a past medical history significant for asthma and migraine who presents to the ED with a complaint of fever for 5 days, dry cough, runny nose, congestion, sore throat, headache and body aches. The patient was seen and evaluated at [facility] emergency department on 2 separate occasion and diagnosed with viral URI. She had intensive work-up that did not reveal anything significant and include a CT scan of the head, chest x-ray, labs, IV fluid and Toradol. She was placed on Z-Avery, Tessalon Perles albuterol inhaler and steroid. The patient denies any significant improvement in symptoms. She had a negative strep test and influenza. Past Medical History:   Diagnosis Date    Asthma     Migraine        History reviewed. No pertinent surgical history.       Family History:   Problem Relation Age of Onset    Migraines Mother        Social History     Socioeconomic History    Marital status: SINGLE     Spouse name: Not on file    Number of children: Not on file    Years of education: Not on file    Highest education level: Not on file   Occupational History    Not on file   Social Needs    Financial resource strain: Not on file    Food insecurity:     Worry: Not on file     Inability: Not on file    Transportation needs:     Medical: Not on file     Non-medical: Not on file   Tobacco Use    Smoking status: Never Smoker    Smokeless tobacco: Never Used   Substance and Sexual Activity    Alcohol use: No     Comment: occasional     Drug use: No    Sexual activity: Yes     Birth control/protection: None   Lifestyle    Physical activity:     Days per week: Not on file     Minutes per session: Not on file    Stress: Not on file   Relationships    Social connections:     Talks on phone: Not on file     Gets together: Not on file     Attends Religion service: Not on file     Active member of club or organization: Not on file     Attends meetings of clubs or organizations: Not on file     Relationship status: Not on file    Intimate partner violence:     Fear of current or ex partner: Not on file     Emotionally abused: Not on file     Physically abused: Not on file     Forced sexual activity: Not on file   Other Topics Concern    Not on file   Social History Narrative    ** Merged History Encounter **              ALLERGIES: Grass pollen; Mold; Tree and shrub pollen; and Weed pollen    Review of Systems   All other systems reviewed and are negative. Vitals:    01/[birth year] 1941   BP: 112/71   Pulse: 98   Resp: 16   Temp: (!) 100.6 °F (38.1 °C)   SpO2: 100%   Weight: 53.1 kg (117 lb)   Height: 4' 11\" (1.499 m)            Physical Exam  Vitals signs and nursing note reviewed. CONSTITUTIONAL: Well-appearing; well-nourished; in no apparent distress  HEAD: Normocephalic; atraumatic  EYES: PERRL; EOM intact; conjunctiva and sclera are clear bilaterally. ENT: No rhinorrhea; normal pharynx with no tonsillar hypertrophy; mucous membranes pink/moist, no erythema, no exudate. NECK: Supple; non-tender; no cervical lymphadenopathy  CARD: Normal S1, S2; no murmurs, rubs, or gallops. Regular rate and rhythm. RESP: Normal respiratory effort; breath sounds clear and equal bilaterally; no wheezes, rhonchi, or rales. ABD: Normal bowel sounds; non-distended; non-tender; no palpable organomegaly, no masses, no bruits. Back Exam: Normal inspection; no vertebral point tenderness, no CVA tenderness. Normal range of motion. EXT: Normal ROM in all four extremities; non-tender to palpation; no swelling or deformity; distal pulses are normal, no edema. SKIN: Warm; dry; no rash. NEURO:Alert and oriented x 3, coherent, CYNTHIA-XII grossly intact, sensory and motor are non-focal.      MDM  Number of Diagnoses or Management Options  Acute febrile illness:   Influenza-like syndrome:   Diagnosis management comments: Assessment:  This is a 17-year-old female with influenza-like syndrome. The patient has a benign exam with stable vital signs. She appears well. Plan: Education, reassurance, symptomatic treatment/antipyretic/analgesia/ Monitor and Reevaluate. Procedures      Progress Note:   Pt has been reexamined by Jerrica Barragan MD. Pt is feeling much better. Symptoms have improved. All available results have been reviewed with pt and any available family. Pt understands sx, dx, and tx in ED. Care plan has been outlined and questions have been answered. Pt is ready to go home. Will send home on  Today conductive nausea vomiting  This is a 12-year-old female with normal exam and stable vital signs who appears acute febrile illness and influenza acute febrile illness and influenza instruction acute febrile illness and influenza instruction. Outpatient referral with PCP as needed. Written by Jerrica Barragan MD,8:56 PM    .   .

## 2020-01-06 NOTE — ED NOTES
Patient received discharge instructions by MD and nurse. Patient verbalized understanding of medication use and other discharge instructions. Patient ambulated out of ED with steady gait, showing no signs of distress. Pt reports relief of most intense pain. All questions answered.

## 2020-09-21 ENCOUNTER — VIRTUAL VISIT (OUTPATIENT)
Dept: INTERNAL MEDICINE CLINIC | Age: 25
End: 2020-09-21
Payer: MEDICAID

## 2020-09-21 DIAGNOSIS — M79.641 PAIN IN BOTH HANDS: Primary | ICD-10-CM

## 2020-09-21 DIAGNOSIS — M79.642 PAIN IN BOTH HANDS: Primary | ICD-10-CM

## 2020-09-21 DIAGNOSIS — G56.03 CARPAL TUNNEL SYNDROME, BILATERAL: ICD-10-CM

## 2020-09-21 PROCEDURE — 99214 OFFICE O/P EST MOD 30 MIN: CPT | Performed by: INTERNAL MEDICINE

## 2020-09-21 RX ORDER — NAPROXEN 500 MG/1
500 TABLET ORAL 2 TIMES DAILY WITH MEALS
Qty: 30 TAB | Refills: 1 | Status: SHIPPED | OUTPATIENT
Start: 2020-09-21 | End: 2021-11-22 | Stop reason: SDUPTHER

## 2020-09-21 RX ORDER — ALBUTEROL SULFATE 90 UG/1
2 AEROSOL, METERED RESPIRATORY (INHALATION)
COMMUNITY
End: 2020-10-21 | Stop reason: SDUPTHER

## 2020-09-21 RX ORDER — ARM BRACE
EACH MISCELLANEOUS
Qty: 2 EACH | Refills: 2 | Status: SHIPPED | OUTPATIENT
Start: 2020-09-21 | End: 2021-11-16 | Stop reason: ALTCHOICE

## 2020-09-21 NOTE — PATIENT INSTRUCTIONS
1.  If you need the wrist braces sent to another pharmacy, or faxed, please let us know. We can also print a copy for you to pick-up here if needed/preferrred. 2.  Urgent Care Orthopedic Referral Locations:  Samira Solis 
 
 
? [facility] 
[address], [address] [place], [address] [phone] Hours Monday - Friday 5:30 pm  9:00 pm 
Saturday 8:00 am  noon ? [address], [address] [place] [address] 
[phone]-[facility]([id number]) Hours Monday-Saturday 9:00 am  9:00 pm 
Sunday 11:00 am  5:00 pm 
 
? [facility]ta[facility] [address] [place] [address] [address], [address] [place] [address] 
[phone]([id number]) Hours Monday - Saturday 9:00 am  9:00 pm 
Sunday 11:00 am  5:00 pm 
 
Visit website: [email or web address] 
 
 
  
Carpal Tunnel Syndrome: Care Instructions Overview Carpal tunnel syndrome is numbness, tingling, weakness, and pain in your hand, wrist, and sometimes forearm. It is caused by pressure on the median nerve. This nerve and several tough tissues called tendons run through a space in the wrist. This space is called the carpal tunnel. The repeated hand motions used in work and some hobbies and sports can put pressure on the median nerve. Pregnancy can cause carpal tunnel syndrome. Several conditions, such as diabetes, arthritis, and an underactive thyroid, can also cause it. You may be able to limit an activity or change the way you do it to reduce your symptoms. You also can take other steps to feel better. If your symptoms are mild, 1 to 2 weeks of home treatment are likely to ease your pain. Surgery is needed only if other treatments do not work. Follow-up care is a key part of your treatment and safety. Be sure to make and go to all appointments, and call your doctor if you are having problems. It's also a good idea to know your test results and keep a list of the medicines you take. How can you care for yourself at home? · If possible, stop or reduce the activity that causes your symptoms. If you cannot stop the activity, take frequent breaks to rest and stretch or change hand positions to do a task. Try switching hands, such as when using a computer mouse. · Try to avoid bending or twisting your wrists. · Ask your doctor if you can take an over-the-counter pain medicine, such as acetaminophen (Tylenol), ibuprofen (Advil, Motrin), or naproxen (Aleve). Be safe with medicines. Read and follow all instructions on the label. · If your doctor prescribes corticosteroid medicine to help reduce pain and swelling, take it exactly as prescribed. Call your doctor if you think you are having a problem with your medicine. · Put ice or a cold pack on your wrist for 10 to 20 minutes at a time to ease pain. Put a thin cloth between the ice and your skin. · If your doctor or your physical or occupational therapist tells you to wear a wrist splint, wear it as directed to keep your wrist in a neutral position. This also eases pressure on your median nerve. · Ask your doctor whether you should have physical or occupational therapy to learn how to do tasks differently. · Try a yoga class to stretch your muscles and build strength in your hands and wrists. Yoga has been shown to ease carpal tunnel symptoms. To prevent carpal tunnel · When working at a computer, keep your hands and wrists in line with your forearms. Hold your elbows close to your sides. Take a break every 10 to 15 minutes. · Try these exercises: 
? Warm up: Rotate your wrist up, down, and from side to side. Repeat this 4 times. Stretch your fingers far apart, relax them, then stretch them again. Repeat 4 times. Stretch your thumb by pulling it back gently, holding it, and then releasing it. Repeat 4 times. ? Prayer stretch: Start with your palms together in front of your chest just below your chin.  Slowly lower your hands toward your waistline while keeping your hands close to your stomach and your palms together until you feel a mild to moderate stretch under your forearms. Hold for 10 to 20 seconds. Repeat 4 times. ? Wrist flexor stretch: Hold your arm in front of you with your palm up. Bend your wrist, pointing your hand toward the floor. With your other hand, gently bend your wrist further until you feel a mild to moderate stretch in your forearm. Hold for 10 to 20 seconds. Repeat 4 times. ? Wrist extensor stretch: Repeat the steps for the wrist flexor stretch, but begin with your extended hand palm down. · Squeeze a rubber exercise ball several times a day to keep your hands and fingers strong. · Avoid holding objects (such as a book) in one position for a long time. When possible, use your whole hand to grasp an object. Using just the thumb and index finger can put stress on the wrist. 
· Do not smoke. It can make this condition worse by reducing blood flow to the median nerve. If you need help quitting, talk to your doctor about stop-smoking programs and medicines. These can increase your chances of quitting for good. When should you call for help? Watch closely for changes in your health, and be sure to contact your doctor if: 
  · Your pain or other problems do not get better with home care.  
  · You want more information about physical or occupational therapy.  
  · You have side effects of your corticosteroid medicine, such as: 
? Weight gain. ? Mood changes. ? Trouble sleeping. ? Bruising easily.  
  · You have any other problems with your medicine. Where can you learn more? Go to http://hudson-marietta.info/ Enter R432 in the search box to learn more about \"Carpal Tunnel Syndrome: Care Instructions. \" Current as of: March 2, 2020               Content Version: 12.6 © 9563-8683 [employer]wise, Incorporated.   
Care instructions adapted under license by [employer] (which disclaims liability or warranty for this information). If you have questions about a medical condition or this instruction, always ask your healthcare professional. [address] any warranty or liability for your use of this information. Carpal Tunnel Syndrome: Exercises Introduction Here are some examples of exercises for you to try. The exercises may be suggested for a condition or for rehabilitation. Start each exercise slowly. Ease off the exercises if you start to have pain. You will be told when to start these exercises and which ones will work best for you. Warm-up stretches When you no longer have pain or numbness, you can do exercises to help prevent carpal tunnel syndrome from coming back. Do not do any stretch or movement that is uncomfortable or painful. 1. Rotate your wrist up, down, and from side to side. Repeat 4 times. 2. Stretch your fingers far apart. Relax them, and then stretch them again. Repeat 4 times. 3. Stretch your thumb by pulling it back gently, holding it, and then releasing it. Repeat 4 times. How to do the exercises Prayer stretch 1. Start with your palms together in front of your chest just below your chin. 2. Slowly lower your hands toward your waistline, keeping your hands close to your stomach and your palms together until you feel a mild to moderate stretch under your forearms. 3. Hold for at least 15 to 30 seconds. Repeat 2 to 4 times. Wrist flexor stretch 1. Extend your arm in front of you with your palm up. 2. Bend your wrist, pointing your hand toward the floor. 3. With your other hand, gently bend your wrist farther until you feel a mild to moderate stretch in your forearm. 4. Hold for at least 15 to 30 seconds. Repeat 2 to 4 times. Wrist extensor stretch 1. Repeat steps 1 through 4 of the stretch above, but begin with your extended hand palm down. Follow-up care is a key part of your treatment and safety. Be sure to make and go to all appointments, and call your doctor if you are having problems. It's also a good idea to know your test results and keep a list of the medicines you take. Where can you learn more? Go to http://www.gray.com/ Enter R402 in the search box to learn more about \"Carpal Tunnel Syndrome: Exercises. \" Current as of: March 2, 2020               Content Version: 12.6 © 2006-2020 [employer]. Care instructions adapted under license by [employer] (which disclaims liability or warranty for this information). If you have questions about a medical condition or this instruction, always ask your healthcare professional. [address] any warranty or liability for your use of this information. Hand Pain: Care Instructions Your Care Instructions Common causes of hand pain are overuse and injuries, such as might happen during sports or home repair projects. Everyday wear and tear, especially as you get older, also can cause hand pain. Most minor hand injuries will heal on their own, and home treatment is usually all you need to do. If you have sudden and severe pain, you may need tests and treatment. Follow-up care is a key part of your treatment and safety. Be sure to make and go to all appointments, and call your doctor if you are having problems. It's also a good idea to know your test results and keep a list of the medicines you take. How can you care for yourself at home? · Take pain medicines exactly as directed. ? If the doctor gave you a prescription medicine for pain, take it as prescribed. ? If you are not taking a prescription pain medicine, ask your doctor if you can take an over-the-counter medicine. · Rest and protect your hand. Take a break from any activity that may cause pain. · Put ice or a cold pack on your hand for 10 to 20 minutes at a time. Put a thin cloth between the ice and your skin. · Prop up the sore hand on a pillow when you ice it or anytime you sit or lie down during the next 3 days. Try to keep it above the level of your heart. This will help reduce swelling. · If your doctor recommends a sling, splint, or elastic bandage to support your hand, wear it as directed. When should you call for help? Call 911 anytime you think you may need emergency care. For example, call if: 
  · Your hand turns cool or pale or changes color. Call your doctor now or seek immediate medical care if: 
  · You cannot move your hand.  
  · Your hand pops, moves out of its normal position, and then returns to its normal position.  
  · You have signs of infection, such as: 
? Increased pain, swelling, warmth, or redness. ? Red streaks leading from the sore area. ? Pus draining from a place on your hand. ? A fever.  
  · Your hand feels numb or tingly. Watch closely for changes in your health, and be sure to contact your doctor if: 
  · Your hand feels unstable when you try to use it.  
  · You do not get better as expected.  
  · You have any new symptoms, such as swelling.  
  · Bruises from an injury to your hand last longer than 2 weeks. Where can you learn more? Go to http://hudson-marietta.info/ Enter R273 in the search box to learn more about \"Hand Pain: Care Instructions. \" Current as of: June 26, 2019               Content Version: 12.6 © 4151-6276 Healthwise, Incorporated. Care instructions adapted under license by [employer] (which disclaims liability or warranty for this information). If you have questions about a medical condition or this instruction, always ask your healthcare professional. [address] any warranty or liability for your use of this information.

## 2020-09-21 NOTE — PROGRESS NOTES
Marsa Cheadle is a 30 y.o. female who was seen by synchronous (real-time) audio-video technology on 9/21/2020. Consent: Marsa Cheadle, who was seen by synchronous (real-time) audio-video technology, and/or her healthcare decision maker, is aware that this patient-initiated, Telehealth encounter on 9/21/2020 is a billable service, with coverage as determined by her insurance carrier. She is aware that she may receive a bill and has provided verbal consent to proceed: Yes. I was in the office while conducting this encounter. Subjective:   Marsa Cheadle was seen for Wrist Pain      Notes:  Started left, then right--worsened over tie. She [employer] part-time, and now not resolving. She has had for 2-3 days. She has taken some time off. She has had soreness 1-2 days then resolves. She has been taking Tylenol, and Excedrin. She took Tylenol as 250mg Excedrin. Nursing screenings reviewed by provider at visit. Allergies   Allergen Reactions    Grass Pollen Sneezing    Mold Sneezing    Tree And Shrub Pollen Sneezing    Weed Pollen Hives and Sneezing       Prior to Admission medications    Medication Sig Start Date End Date Taking? Authorizing Provider   albuterol (PROVENTIL HFA, VENTOLIN HFA, PROAIR HFA) 90 mcg/actuation inhaler Take 2 Puffs by inhalation four (4) times daily as needed. Provider, Historical         ROS    PHYSICAL EXAMINATION:    Vital Signs: There were no vitals taken for this visit.    Patient-Reported Vitals 9/21/2020   Patient-Reported Weight 117 pounds        Constitutional: [x] Appears well-developed and well-nourished [x] No apparent distress      Mental status: [x] Alert and awake  [x] Oriented [x] Able to follow commands       Eyes:   EOM    [x]  Normal      Sclera  [x]  Normal              Discharge [x]  None visible       HENT: [x] Normocephalic, atraumatic    [x] Mouth/Throat: Mucous membranes are moist    External Ears [x] Normal      Neck: [x] No visualized mass     Pulmonary/Chest: [x] Respiratory effort normal   [x] No visualized signs of difficulty breathing or respiratory distress    Musculoskeletal:  [x] Normal range of motion of neck    Neurological:        [x] No Facial Asymmetry (Cranial nerve 7 motor function) (limited exam due to video visit)          [x] No gaze palsy     Skin:        [x] No significant exanthematous lesions or discoloration noted on facial skin             Psychiatric:       [x] Normal Affect       Other pertinent observable physical exam findings:  None. We discussed the expected course, resolution and complications of the diagnosis(es) in detail. Medication risks, benefits, costs, interactions, and alternatives were discussed as indicated. I advised her to contact the office if her condition worsens, changes or fails to improve as anticipated. She expressed understanding with the diagnosis(es) and plan. Jai Saez is a 30 y.o. female who was evaluated by a video visit encounter for concerns as above. Patient identification was verified prior to start of the visit. A caregiver was present when appropriate. Due to this being a TeleHealth encounter (During [facility]-36 public health emergency), evaluation of the following organ systems was limited: Vitals/Constitutional/EENT/Resp/CV/GI//MS/Neuro/Skin/Heme-Lymph-Imm. Pursuant to the emergency declaration under the [facility], [facility] waiver authority and the [employer] Act, this Virtual  Visit was conducted, with patient's (and/or legal guardian's) consent, to reduce the patient's risk of exposure to COVID-19 and provide necessary medical care. Services were provided through a video synchronous discussion virtually to substitute for in-person clinic visit. Assessment & Plan:   Diagnoses and all orders for this visit:      ICD-10-CM ICD-9-CM    1.  Pain in both hands  M79.641 729.5 Arm Brace (Wrist Brace) [facility]    M79.642  naproxen (NAPROSYN) 500 mg tablet   2. Carpal tunnel syndrome, bilateral  G56.03 354.0 naproxen (NAPROSYN) 500 mg tablet       1,2:  Medication(s), management and follow-up based on response reviewed at visit. Prefers trial of brace over ortho eval at this time. Follow-up and Dispositions    · Return if symptoms worsen or fail to improve. reviewed medications and side effects in detail    For additional documentation of information and/or recommendations discussed this visit, please see notes in instructions. Plan and evaluation (above) reviewed with pt at visit  Patient voiced understanding of plan and provided with time to ask/review questions. After Visit Summary (AVS) provided to pt after visit with additional instructions as needed/reviewed. AVS:  [x]  Sent to patient as [employer] message after visit. []  Mailed to patient after visit. []  Not sent to patient after visit. No future appointments.

## 2020-10-21 ENCOUNTER — VIRTUAL VISIT (OUTPATIENT)
Dept: INTERNAL MEDICINE CLINIC | Age: 25
End: 2020-10-21
Payer: MEDICAID

## 2020-10-21 DIAGNOSIS — J30.2 SEASONAL ALLERGIC RHINITIS, UNSPECIFIED TRIGGER: ICD-10-CM

## 2020-10-21 DIAGNOSIS — J45.20 MILD INTERMITTENT ASTHMA WITHOUT COMPLICATION: ICD-10-CM

## 2020-10-21 DIAGNOSIS — J01.10 ACUTE FRONTAL SINUSITIS, RECURRENCE NOT SPECIFIED: Primary | ICD-10-CM

## 2020-10-21 DIAGNOSIS — J45.21 MILD INTERMITTENT ASTHMA WITH EXACERBATION: ICD-10-CM

## 2020-10-21 PROCEDURE — 99214 OFFICE O/P EST MOD 30 MIN: CPT | Performed by: INTERNAL MEDICINE

## 2020-10-21 RX ORDER — PREDNISONE 20 MG/1
60 TABLET ORAL
Qty: 15 TAB | Refills: 0 | Status: SHIPPED | OUTPATIENT
Start: 2020-10-21 | End: 2021-11-16 | Stop reason: ALTCHOICE

## 2020-10-21 RX ORDER — MONTELUKAST SODIUM 10 MG/1
10 TABLET ORAL DAILY
Qty: 30 TAB | Refills: 5 | Status: SHIPPED | OUTPATIENT
Start: 2020-10-21 | End: 2021-11-16 | Stop reason: ALTCHOICE

## 2020-10-21 RX ORDER — AZITHROMYCIN 250 MG/1
TABLET, FILM COATED ORAL
Qty: 6 TAB | Refills: 0 | Status: SHIPPED | OUTPATIENT
Start: 2020-10-21 | End: 2020-10-26

## 2020-10-21 RX ORDER — ALBUTEROL SULFATE 90 UG/1
2 AEROSOL, METERED RESPIRATORY (INHALATION)
Qty: 1 INHALER | Refills: 2 | Status: SHIPPED | OUTPATIENT
Start: 2020-10-21

## 2020-10-21 NOTE — LETTER
NOTIFICATION RETURN TO WORK   
 
10/21/2020 Ms. Nadya Rdz [address] [place] [phone] To Whom It May Concern: Nadya Rdz is currently under the care of [facility]. She will return to work  on: 10/26/20 If there are questions or concerns please have the patient contact our office. Sincerely, Maryana Hernandez MD

## 2020-10-21 NOTE — PROGRESS NOTES
Yolanda Pinto is a 30 y.o. female who was seen by synchronous (real-time) audio-video technology on 10/21/2020. Consent: Yolanda Pinto, who was seen by synchronous (real-time) audio-video technology, and/or her healthcare decision maker, is aware that this patient-initiated, Telehealth encounter on 10/21/2020 is a billable service, with coverage as determined by her insurance carrier. She is aware that she may receive a bill and has provided verbal consent to proceed: Yes. I was in the office while conducting this encounter. Subjective:   Yolanda Pinto was seen for Sinus Infection (Patient reports sneezing and congestion, started yesterday. Denies fever. )      Notes:  Hx asthma  Increased cough and wheeze  Using albuterol   Non-productive cough    Increased sinus pressure, +PND  Chronic allergic rhinitis prior to more severe sx in recent days    No known sick contacts    +subjective chills but no documented fevers    Nursing screenings reviewed by provider at visit. Past medical, Social, and Family history reviewed  Medications reviewed and updated. Allergies   Allergen Reactions    Grass Pollen Sneezing    Mold Sneezing    Tree And Shrub Pollen Sneezing    Weed Pollen Hives and Sneezing       Prior to Admission medications    Medication Sig Start Date End Date Taking? Authorizing Provider   albuterol (PROVENTIL HFA, VENTOLIN HFA, PROAIR HFA) 90 mcg/actuation inhaler Take 2 Puffs by inhalation four (4) times daily as needed. Yes Provider, Historical   Arm Brace (Wrist Brace) [facility] Fit and dispense wrist braces for both wrists, to wear nightly for hand pain. 9/21/20  Yes Guru Perez MD         ROS     A complete ROS was performed and negative except as noted in HPI      PHYSICAL EXAMINATION:    Vital Signs: There were no vitals taken for this visit.     Patient-Reported Vitals 9/21/2020   Patient-Reported Weight 117 pounds         Constitutional: [x] Appears well-developed and well-nourished [x] No apparent distress      Mental status: [x] Alert and awake  [x] Oriented [x] Able to follow commands       Eyes:   EOM    [x]  Normal      Sclera  [x]  Normal              Discharge [x]  None visible       HENT: [x] Normocephalic, atraumatic    [x] Mouth/Throat: Mucous membranes are moist    External Ears [x] Normal      Neck: [x] No visualized mass     Pulmonary/Chest: [x] Respiratory effort normal   [x] No visualized signs of difficulty breathing or respiratory distress    Musculoskeletal:  [x] Normal range of motion of neck    Neurological:        [x] No Facial Asymmetry (Cranial nerve 7 motor function) (limited exam due to video visit)          [x] No gaze palsy     Skin:        [x] No significant exanthematous lesions or discoloration noted on facial skin             Psychiatric:       [x] Normal Affect       Other pertinent observable physical exam findings:  None. We discussed the expected course, resolution and complications of the diagnosis(es) in detail. Medication risks, benefits, costs, interactions, and alternatives were discussed as indicated. I advised her to contact the office if her condition worsens, changes or fails to improve as anticipated. She expressed understanding with the diagnosis(es) and plan. Haley Thayer is a 30 y.o. female who was evaluated by a video visit encounter for concerns as above. Patient identification was verified prior to start of the visit. A caregiver was present when appropriate. Due to this being a TeleHealth encounter (During [place] [address] public health emergency), evaluation of the following organ systems was limited: Vitals/Constitutional/EENT/Resp/CV/GI//MS/Neuro/Skin/Heme-Lymph-Imm.   Pursuant to the emergency declaration under the [facility] waiver authority and the [employer] and [employer] Act, this Virtual  Visit was conducted, with patient's (and/or legal guardian's) consent, to reduce the patient's risk of exposure to COVID-19 and provide necessary medical care. Services were provided through a video synchronous discussion virtually to substitute for in-person clinic visit. Assessment & Plan:   Diagnoses and all orders for this visit:    Sinusitis and asthma exac complicating allergies      ICD-10-CM ICD-9-CM    1. Acute frontal sinusitis, recurrence not specified  J01.10 461. 1 predniSONE (DELTASONE) 20 mg tablet      azithromycin (ZITHROMAX) 250 mg tablet   2. Mild intermittent asthma with exacerbation  J45.21 493.92 albuterol (PROVENTIL HFA, VENTOLIN HFA, PROAIR HFA) 90 mcg/actuation inhaler      predniSONE (DELTASONE) 20 mg tablet   3. Seasonal allergic rhinitis, unspecified trigger  J30.2 477.9    4. Mild intermittent asthma without complication  N31.65 262.79 montelukast (SINGULAIR) 10 mg tablet     Follow-up and Dispositions    · Return in about 2 months (around 12/21/2020), or if symptoms worsen or fail to improve, for asthma. results and schedule of future studies reviewed with patient   reviewed medications and side effects in detail    pred burst  azithro  Start singulair  Albuterol   Expectorant and decongestant ok for symptom relief  Defer flu shot until off pred, but encouraged flu shot        AVS:  [x]  Sent to patient as [employer]hart message after visit. []  Mailed to patient after visit. []  Not sent to patient after visit.

## 2020-10-21 NOTE — PROGRESS NOTES
Virtual Visit    Chief Complaint   Patient presents with    Sinus Infection     Patient reports sneezing and congestion, started yesterday. Denies fever. 1. Have you been to the ER, urgent care clinic since your last visit? Hospitalized since your last visit? No    2. Have you seen or consulted any other health care providers outside of the [address] [place] [address] since your last visit? Include any pap smears or colon screening. No    Health Maintenance Due   Topic Date Due    DTaP/Tdap/Td series (1 - Tdap) 09/29/2014    PAP AKA CERVICAL CYTOLOGY  09/29/2014    Flu Vaccine (1) 09/01/2020     Abuse Screening Questionnaire 10/21/2020   Do you ever feel afraid of your partner? N   Are you in a relationship with someone who physically or mentally threatens you? N   Is it safe for you to go home?  Y       3 most recent PHQ Screens 10/21/2020   Little interest or pleasure in doing things Not at all   Feeling down, depressed, irritable, or hopeless Not at all   Total Score PHQ 2 0

## 2021-11-16 ENCOUNTER — OFFICE VISIT (OUTPATIENT)
Dept: URGENT CARE | Age: 26
End: 2021-11-16
Payer: MEDICAID

## 2021-11-16 VITALS
OXYGEN SATURATION: 98 % | TEMPERATURE: 98.2 F | SYSTOLIC BLOOD PRESSURE: 105 MMHG | HEART RATE: 83 BPM | DIASTOLIC BLOOD PRESSURE: 66 MMHG | RESPIRATION RATE: 16 BRPM

## 2021-11-16 DIAGNOSIS — M54.12 CERVICAL RADICULOPATHY: ICD-10-CM

## 2021-11-16 DIAGNOSIS — G56.03 BILATERAL CARPAL TUNNEL SYNDROME: Primary | ICD-10-CM

## 2021-11-16 PROCEDURE — 99213 OFFICE O/P EST LOW 20 MIN: CPT | Performed by: FAMILY MEDICINE

## 2021-11-16 RX ORDER — PREDNISONE 5 MG/1
TABLET ORAL
Qty: 21 TABLET | Refills: 0 | Status: SHIPPED | OUTPATIENT
Start: 2021-11-16 | End: 2021-11-22

## 2021-11-16 NOTE — LETTER
NOTIFICATION TO RETURN TO WORK / SCHOOL    19     Ms. Rolla Apgar   [address] [facility] [address]       To Whom It May Concern:    Rolla Apgar was seen today at [facility]. Excuse from work today. If there are questions or concerns please have the patient contact our office.         Sincerely,        Renetta Mcdaniel MD

## 2021-11-16 NOTE — PATIENT INSTRUCTIONS
Wear wrist  especially at bedtime x 1 month  Follow up with Ortho       Pinched Nerve in the Neck: Care Instructions  Your Care Instructions  A pinched nerve in the neck happens when a vertebra or disc in the upper part of your spine is damaged. This damage can happen because of an injury. Or it can just happen with age. The changes caused by the damage may put pressure on a nearby nerve root, pinching it. This causes symptoms such as sharp pain in your neck, shoulder, arm, hand, or back. You may also have tingling or numbness. Sometimes it makes your arm weaker. The symptoms are usually worse when you turn your head or strain your neck. For many people, the symptoms get better over time and finally go away. Early treatment usually includes medicines for pain and swelling. Sometimes physical therapy and special exercises may help. Follow-up care is a key part of your treatment and safety. Be sure to make and go to all appointments, and call your doctor if you are having problems. It's also a good idea to know your test results and keep a list of the medicines you take. How can you care for yourself at home? · Be safe with medicines. Read and follow all instructions on the label. ? If the doctor gave you a prescription medicine for pain, take it as prescribed. ? If you are not taking a prescription pain medicine, ask your doctor if you can take an over-the-counter medicine. · Try using a heating pad on a low or medium setting for 15 to 20 minutes every 2 or 3 hours. Try a warm shower in place of one session with the heating pad. You can also buy single-use heat wraps that last up to 8 hours. · You can also try an ice pack for 10 to 15 minutes every 2 to 3 hours. There isn't strong evidence that either heat or ice will help. But you can try them to see if they help you. · Don't spend too long in one position. Take short breaks to move around and change positions.   · Wear a seat belt and shoulder harness when you are in a car. · Sleep with a pillow under your head and neck that keeps your neck straight. · If you were given a neck brace (cervical collar) to limit neck motion, wear it as instructed for as many days as your doctor tells you to. Do not wear it longer than you were told to. Wearing a brace for too long can lead to neck stiffness and can weaken the neck muscles. · Follow your doctor's instructions for gentle neck-stretching exercises. · Do not smoke. Smoking can slow healing of your discs. If you need help quitting, talk to your doctor about stop-smoking programs and medicines. These can increase your chances of quitting for good. · Avoid strenuous work or exercise until your doctor says it is okay. When should you call for help? Call 911 anytime you think you may need emergency care. For example, call if:    · You are unable to move an arm or a leg at all. Call your doctor now or seek immediate medical care if:    · You have new or worse symptoms in your arms, legs, chest, belly, or buttocks. Symptoms may include:  ? Numbness or tingling. ? Weakness. ? Pain.     · You lose bladder or bowel control. Watch closely for changes in your health, and be sure to contact your doctor if:    · You are not getting better as expected. Where can you learn more? Go to http://www.gray.com/  Enter C300 in the search box to learn more about \"Pinched Nerve in the Neck: Care Instructions. \"  Current as of: July 1, 2021               Content Version: 13.0  © 2006-2021 Healthwise, Incorporated. Care instructions adapted under license by [employer] (which disclaims liability or warranty for this information). If you have questions about a medical condition or this instruction, always ask your healthcare professional. [place] [address] any warranty or liability for your use of this information.          Carpal Tunnel Syndrome: Exercises  Introduction  Here are some examples of exercises for you to try. The exercises may be suggested for a condition or for rehabilitation. Start each exercise slowly. Ease off the exercises if you start to have pain. You will be told when to start these exercises and which ones will work best for you. Warm-up stretches  When you no longer have pain or numbness, you can do exercises to help prevent carpal tunnel syndrome from coming back. Do not do any stretch or movement that is uncomfortable or painful. 1. Rotate your wrist up, down, and from side to side. Repeat 4 times. 2. Stretch your fingers far apart. Relax them, and then stretch them again. Repeat 4 times. 3. Stretch your thumb by pulling it back gently, holding it, and then releasing it. Repeat 4 times. How to do the exercises  Prayer stretch    1. Start with your palms together in front of your chest just below your chin. 2. Slowly lower your hands toward your waistline, keeping your hands close to your stomach and your palms together until you feel a mild to moderate stretch under your forearms. 3. Hold for at least 15 to 30 seconds. Repeat 2 to 4 times. Wrist flexor stretch    1. Extend your arm in front of you with your palm up. 2. Bend your wrist, pointing your hand toward the floor. 3. With your other hand, gently bend your wrist farther until you feel a mild to moderate stretch in your forearm. 4. Hold for at least 15 to 30 seconds. Repeat 2 to 4 times. Wrist extensor stretch    1. Repeat steps 1 through 4 of the stretch above, but begin with your extended hand palm down. Follow-up care is a key part of your treatment and safety. Be sure to make and go to all appointments, and call your doctor if you are having problems. It's also a good idea to know your test results and keep a list of the medicines you take. Where can you learn more?   Go to http://www.gray.com/  Enter U404 in the search box to learn more about \"Carpal Tunnel Syndrome: Exercises. \"  Current as of: July 1, 2021               Content Version: 13.0  © 6756-1095 Healthwise, Incorporated. Care instructions adapted under license by [employer] (which disclaims liability or warranty for this information). If you have questions about a medical condition or this instruction, always ask your healthcare professional. [address] any warranty or liability for your use of this information.

## 2021-11-16 NOTE — PROGRESS NOTES
Karol Singh is a 27 y.o. female who presents with bilateral hand pain R>L worsening in the past 2 days; has had intermittent pain for the past year. Reports pain radiates from right shoulder down to right hand. [employer] for work. Changing jobs soon, will be working remote. Has tried tylenol without relief. The history is provided by the patient. Past Medical History:   Diagnosis Date    Asthma     Migraine         History reviewed. No pertinent surgical history. Family History   Problem Relation Age of Onset    Migraines Mother         Social History     Socioeconomic History    Marital status: SINGLE     Spouse name: Not on file    Number of children: Not on file    Years of education: Not on file    Highest education level: Not on file   Occupational History    Not on file   Tobacco Use    Smoking status: Never Smoker    Smokeless tobacco: Never Used   Substance and Sexual Activity    Alcohol use: No     Comment: occasional     Drug use: No    Sexual activity: Yes     Birth control/protection: None   Other Topics Concern    Not on file   Social History Narrative    ** Merged History Encounter **          Social Determinants of Health     Financial Resource Strain:     Difficulty of Paying Living Expenses: Not on file   Food Insecurity:     Worried About Running Out of Food in the Last Year: Not on file    Tacho of Food in the Last Year: Not on file   Transportation Needs:     Lack of Transportation (Medical): Not on file    Lack of Transportation (Non-Medical):  Not on file   Physical Activity:     Days of Exercise per Week: Not on file    Minutes of Exercise per Session: Not on file   Stress:     Feeling of Stress : Not on file   Social Connections:     Frequency of Communication with Friends and Family: Not on file    Frequency of Social Gatherings with Friends and Family: Not on file    Attends Catholic Services: Not on file   [employer] of [employer]s or Organizations: Not on file    Attends Club or Organization Meetings: Not on file    Marital Status: Not on file   Intimate Partner Violence:     Fear of Current or Ex-Partner: Not on file    Emotionally Abused: Not on file    Physically Abused: Not on file    Sexually Abused: Not on file   Housing Stability:     Unable to Pay for Housing in the Last Year: Not on file    Number of [place] in the Last Year: Not on file    Unstable Housing in the Last Year: Not on file                ALLERGIES: Grass pollen, Mold, Tree and shrub pollen, and Weed pollen    Review of Systems   Musculoskeletal: Positive for arthralgias. Negative for joint swelling. Skin: Negative for color change. Vitals:    11/16/21 1309   BP: 105/66   Pulse: 83   Resp: 16   Temp: 98.2 °F (36.8 °C)   SpO2: 98%       Physical Exam  Vitals and nursing note reviewed. Constitutional:       General: She is not in acute distress. Appearance: She is well-developed. She is not diaphoretic. Pulmonary:      Effort: Pulmonary effort is normal.   Musculoskeletal:      Right shoulder: Tenderness present. No bony tenderness. Normal range of motion. Normal strength. Left shoulder: No tenderness or bony tenderness. Normal range of motion. Normal strength. Cervical back: Full passive range of motion without pain. No pain with movement, spinous process tenderness or muscular tenderness. Normal range of motion. Neurological:      Mental Status: She is alert. Comments: Right hand: slight decreased tactile sensation    Negative tinel's and phalen's signs bilaterally   Psychiatric:         Behavior: Behavior normal.         Thought Content: Thought content normal.         Judgment: Judgment normal.         MDM    ICD-10-CM ICD-9-CM   1. Bilateral carpal tunnel syndrome  G56.03 354.0   2.  Cervical radiculopathy  M54.12 723.4       Orders Placed This Encounter    WRIST BRACE    predniSONE (STERAPRED) 5 mg dose pack     Sig: See administration instruction per 5mg dose pack     Dispense:  21 Tablet     Refill:  0      Wear wrist brace at night  Avoid pressure on shoulders ie purses, backpacks  Ergonomic desk setting  The patient is to follow up with ortho        Procedures

## 2021-11-22 ENCOUNTER — OFFICE VISIT (OUTPATIENT)
Dept: INTERNAL MEDICINE CLINIC | Age: 26
End: 2021-11-22
Payer: MEDICAID

## 2021-11-22 VITALS
BODY MASS INDEX: 22.9 KG/M2 | HEART RATE: 85 BPM | OXYGEN SATURATION: 98 % | HEIGHT: 59 IN | TEMPERATURE: 98.2 F | RESPIRATION RATE: 16 BRPM | SYSTOLIC BLOOD PRESSURE: 94 MMHG | DIASTOLIC BLOOD PRESSURE: 63 MMHG | WEIGHT: 113.6 LBS

## 2021-11-22 DIAGNOSIS — Z82.61 FH: RHEUMATOID ARTHRITIS: ICD-10-CM

## 2021-11-22 DIAGNOSIS — M79.642 PAIN IN BOTH HANDS: Primary | ICD-10-CM

## 2021-11-22 DIAGNOSIS — G56.03 CARPAL TUNNEL SYNDROME, BILATERAL: ICD-10-CM

## 2021-11-22 DIAGNOSIS — M79.641 PAIN IN BOTH HANDS: Primary | ICD-10-CM

## 2021-11-22 PROCEDURE — 99214 OFFICE O/P EST MOD 30 MIN: CPT | Performed by: INTERNAL MEDICINE

## 2021-11-22 RX ORDER — ARM BRACE
EACH MISCELLANEOUS
Qty: 2 EACH | Refills: 2 | Status: SHIPPED | OUTPATIENT
Start: 2021-11-22

## 2021-11-22 RX ORDER — NAPROXEN 500 MG/1
500 TABLET ORAL 2 TIMES DAILY WITH MEALS
Qty: 30 TABLET | Refills: 1 | Status: SHIPPED | OUTPATIENT
Start: 2021-11-22 | End: 2021-11-29

## 2021-11-22 NOTE — PROGRESS NOTES
Nabila Heath (: 1995) is a 27 y.o. female, established patient, here for evaluation of the following chief complaint(s):  Chief Complaint   Patient presents with    ED Follow-up     bilat wrist pain       Assessment and Plan:       ICD-10-CM ICD-9-CM    1. Pain in both hands  M79.641 729.5 REFERRAL TO ORTHOPEDICS    M79.642  naproxen (NAPROSYN) 500 mg tablet      Arm Brace (Wrist Brace) misc   2. Carpal tunnel syndrome, bilateral  G56.03 354.0 REFERRAL TO ORTHOPEDICS      naproxen (NAPROSYN) 500 mg tablet      Arm Brace (Wrist Brace) misc   3. FH: rheumatoid arthritis--father's side. Z82.61 V17.7        1,2:  Referral(s) and referral coordination reviewed with patient at visit. Medication(s), management and follow-up based on response reviewed at visit. She has stopped steroid taper for ~3 days. 3.  Plan evaluation with next visit/follow-up and labs. Reviewed could do RA and GARDENIA screenings, but that imaging may help with dx (with ortho). Also reviewed recent steroids could lower inflammatory markers (ESR, CRP). She prefers to eval further at follow-up. Follow-up and Dispositions    · Return in about 2 months (around 2022), or if symptoms worsen or fail to improve, for referral follow-up.       lab results and schedule of future lab studies reviewed with patient  reviewed medications and side effects in detail    For additional documentation of information and/or recommendations discussed this visit, please see notes in instructions. Plan and evaluation (above) reviewed with pt at visit  Patient voiced understanding of plan and provided with time to ask/review questions. After Visit Summary (AVS) provided to pt after visit with additional instructions as needed/reviewed. No future appointments. --Updated future visits after patient check-out.       History of Present Illness:     Notes (nursing/rooming note copied below in italics):  As above    Seen in [facility]  with renee wrist pain. Given prednisone taper (5mg dose pack) and follow-up today planned. Last visit here was VV with  Oct 2020 and VV with me Sept 2020 for hand pain. With Sept 2020 visit, prescribed wrist braces and naprosyn. She notes neither braces or meds helped. She has not seen ortho for eval in interval.    She ntoes wore braces until pain improved, then stopped. She notes pain is now more regular. She will take Tylenol which helps. She has RA on her dad's side. Prefers to do labs to evaluate later. Reviewed can review images if done with ortho also for RA changes. Nursing screenings reviewed by provider at visit. Prior to Admission medications    Medication Sig Start Date End Date Taking? Authorizing Provider   predniSONE (STERAPRED) 5 mg dose pack See administration instruction per 5mg dose pack 11/16/21  Yes Josafat Aquino MD   albuterol (PROVENTIL HFA, VENTOLIN HFA, PROAIR HFA) 90 mcg/actuation inhaler Take 2 Puffs by inhalation every four (4) hours as needed for Wheezing. 10/21/20  Yes MD ANKIT Hdz    Vitals:    11/22/21 1427   BP: 94/63   Pulse: 85   Resp: 16   Temp: 98.2 °F (36.8 °C)   TempSrc: Oral   SpO2: 98%   Weight: 113 lb 9.6 oz (51.5 kg)   Height: 4' 11\" (1.499 m)   LMP: 11/15/2021      Body mass index is 22.94 kg/m². Physical Exam:     Physical Exam  Vitals and nursing note reviewed. Constitutional:       General: She is not in acute distress. Appearance: Normal appearance. She is well-developed. She is not diaphoretic. HENT:      Head: Normocephalic and atraumatic. Eyes:      General: No scleral icterus. Right eye: No discharge. Left eye: No discharge. Conjunctiva/sclera: Conjunctivae normal.   Cardiovascular:      Rate and Rhythm: Normal rate and regular rhythm. Pulses: Normal pulses. Heart sounds: Normal heart sounds. No murmur heard. No friction rub. No gallop.     Pulmonary:      Effort: Pulmonary effort is normal. No respiratory distress. Breath sounds: Normal breath sounds. No stridor. No wheezing, rhonchi or rales. Abdominal:      General: Bowel sounds are normal. There is no distension. Palpations: Abdomen is soft. Tenderness: There is no abdominal tenderness. There is no guarding. Musculoskeletal:         General: No swelling, tenderness, deformity or signs of injury. Right lower leg: No edema. Left lower leg: No edema. Comments: Hands with no numbness/tingling bilat--just bilat wrist pain. Skin:     General: Skin is warm. Coloration: Skin is not jaundiced or pale. Findings: No bruising, erythema or rash. Neurological:      General: No focal deficit present. Mental Status: She is alert. Motor: No abnormal muscle tone. Coordination: Coordination normal.      Gait: Gait normal.   Psychiatric:         Mood and Affect: Mood normal.         Behavior: Behavior normal.         Thought Content: Thought content normal.         Judgment: Judgment normal.         An electronic signature was used to authenticate this note.   -- Valencia Llanes MD

## 2021-11-22 NOTE — PATIENT INSTRUCTIONS
1.  Please follow the following instructions to process/authorize your referral, if needed:    Referrals processing  Please verify with your insurance IF you need referral authorization submitted. For insurance plans which require this, please follow the following steps. FAILURE TO DO SO MAY RESULT IN INABILITY TO SEE THE SPECIALIST YOU HAVE BEEN REFERRED TO (once you are scheduled to see them). 1. Call and schedule appointment with specialist  2. Call our clinic and leave message with provider name, and date of appointment  3. We will then submit the referral to your insurance. This process takes 2-5 business days. If you have questions about scheduling or authorizing referral, you can review with our referral coordinator. You can review with her today if available/if you have time, or you can call to review once you have made your referral/appointment. If you are not sure if you need referral authorizations, please review with the referral coordinator(s), either prior to or after you have made the appointment, as reviewed. 2.  Please see orthopedics at either [facility] or [facility] [address]. [facility] [place]:  [phone] ([address] [place] [address])  [facility]:  [phone]      3. Urgent Care Orthopedic Referral Locations:  [phone] [facility], [address]  [phone]    Hours  Monday - Friday 5:30 pm  9:00 pm  Saturday 8:00 am  noon     [phone], [address]  [phone]    Hours  Monday-Saturday 9:00 am  9:00 pm  Sunday 11:00 am  5:00 pm    Jorge Luis Godfrey  [address]  [phone]([phone])    Hours  Monday - Saturday 9:00 am  9:00 pm  Sunday 11:00 am  5:00 pm    Visit website: [email or web address]

## 2022-02-19 ENCOUNTER — APPOINTMENT (OUTPATIENT)
Dept: ULTRASOUND IMAGING | Age: 27
End: 2022-02-19
Attending: EMERGENCY MEDICINE
Payer: MEDICAID

## 2022-02-19 ENCOUNTER — APPOINTMENT (OUTPATIENT)
Dept: GENERAL RADIOLOGY | Age: 27
End: 2022-02-19
Attending: EMERGENCY MEDICINE
Payer: MEDICAID

## 2022-02-19 ENCOUNTER — HOSPITAL ENCOUNTER (EMERGENCY)
Age: 27
Discharge: HOME | End: 2022-02-19
Attending: EMERGENCY MEDICINE
Payer: MEDICAID

## 2022-02-19 VITALS
HEART RATE: 72 BPM | SYSTOLIC BLOOD PRESSURE: 111 MMHG | DIASTOLIC BLOOD PRESSURE: 76 MMHG | HEIGHT: 59 IN | RESPIRATION RATE: 18 BRPM | BODY MASS INDEX: 23.18 KG/M2 | WEIGHT: 115 LBS | TEMPERATURE: 97.8 F | OXYGEN SATURATION: 100 %

## 2022-02-19 DIAGNOSIS — N89.8 VAGINAL DISCHARGE: Primary | ICD-10-CM

## 2022-02-19 DIAGNOSIS — M54.6 ACUTE BILATERAL THORACIC BACK PAIN: ICD-10-CM

## 2022-02-19 LAB
APPEARANCE UR: CLEAR
BACTERIA URNS QL MICRO: NEGATIVE /HPF
BILIRUB UR QL: NEGATIVE
COLOR UR: ABNORMAL
COMMENT, HOLDF: NORMAL
EPITH CASTS URNS QL MICRO: ABNORMAL /LPF
GLUCOSE UR STRIP.AUTO-MCNC: NEGATIVE MG/DL
HCG UR QL: NEGATIVE
HGB UR QL STRIP: ABNORMAL
KETONES UR QL STRIP.AUTO: NEGATIVE MG/DL
LEUKOCYTE ESTERASE UR QL STRIP.AUTO: ABNORMAL
MUCOUS THREADS URNS QL MICRO: ABNORMAL /LPF
NITRITE UR QL STRIP.AUTO: NEGATIVE
PH UR STRIP: 6 [PH] (ref 5–8)
PROT UR STRIP-MCNC: NEGATIVE MG/DL
RBC #/AREA URNS HPF: ABNORMAL /HPF (ref 0–5)
SAMPLES BEING HELD,HOLD: NORMAL
SP GR UR REFRACTOMETRY: 1.03 (ref 1–1.03)
UROBILINOGEN UR QL STRIP.AUTO: 1 EU/DL (ref 0.2–1)
WBC URNS QL MICRO: ABNORMAL /HPF (ref 0–4)

## 2022-02-19 PROCEDURE — 76770 US EXAM ABDO BACK WALL COMP: CPT

## 2022-02-19 PROCEDURE — 81025 URINE PREGNANCY TEST: CPT

## 2022-02-19 PROCEDURE — 71046 X-RAY EXAM CHEST 2 VIEWS: CPT

## 2022-02-19 PROCEDURE — 76856 US EXAM PELVIC COMPLETE: CPT

## 2022-02-19 PROCEDURE — 99283 EMERGENCY DEPT VISIT LOW MDM: CPT

## 2022-02-19 PROCEDURE — 81001 URINALYSIS AUTO W/SCOPE: CPT

## 2022-02-19 PROCEDURE — 76830 TRANSVAGINAL US NON-OB: CPT

## 2022-02-19 PROCEDURE — 74011250637 HC RX REV CODE- 250/637: Performed by: EMERGENCY MEDICINE

## 2022-02-19 RX ORDER — ACETAMINOPHEN 325 MG/1
650 TABLET ORAL
Status: COMPLETED | OUTPATIENT
Start: 2022-02-19 | End: 2022-02-19

## 2022-02-19 RX ADMIN — ACETAMINOPHEN 650 MG: 325 TABLET ORAL at 08:07

## 2022-02-19 NOTE — ED NOTES
Pt states she cannot provide urine sample at this time. Pt given specimen cup and instructed to alert nursing when she is able to give a urine specimen.

## 2022-02-19 NOTE — ED TRIAGE NOTES
Pt arrives ambulatory from home c/o onset of back pain in the small of her back and vaginal discharge + burning on urination for the past 4 days. She states she tested positive for gonorrhea Feb 1 2022 and was treated with \"a shot. \" She also went to [facility] on Feb 17th for the back pain and discharge and was started on doxycycline which she has started taking with some improvement, but her symptoms are still bothering her now.

## 2022-02-19 NOTE — DISCHARGE INSTRUCTIONS
Thank you for allowing us to provide you with medical care today. We realize that you have many choices for your emergency care needs. We thank you for choosing [facility].  Please choose us in the future for any continued health care needs. We hope we addressed all of your medical concerns. We strive to provide excellent quality care in the Emergency Department. Anything less than excellent does not meet our expectations. The exam and treatment you received in the Emergency Department were for an emergent problem and are not intended as complete care. It is important that you follow up with a doctor, nurse practitioner, or  [id number] assistant for ongoing care. If your symptoms worsen or you do not improve as expected and you are unable to reach your usual health care provider, you should return to the Emergency Department. We are available 24 hours a day. Take this sheet with you when you go to your follow-up visit. If you have any problem arranging the follow-up visit, contact the Emergency Department immediately. Make an appointment your family doctor for follow up of this visit. Return to the ER if you are unable to be seen in a timely manner.

## 2022-02-19 NOTE — ED NOTES
Pt seen by ED physician. Reports currently taking antibiotics for STD and states \"It is getting better\". GC swab d/c per Dr. Janeth Azar.

## 2022-02-19 NOTE — ED PROVIDER NOTES
Please note that this dictation was completed with [employer], the computer voice recognition software.  Quite often unanticipated grammatical, syntax, homophones, and other interpretive errors are inadvertently transcribed by the computer software.  Please disregard these errors.  Please excuse any errors that have escaped final proofreading. 18-year-old female past medical history markable for asthma and migraines presents the ER POV complaining of \" had a bit of blood in my urine from my vagina I am not sure which. Patient states she recently had her menstrual cycle does not think it is related to her menstrual cycle. Patient states this all started several weeks ago when she developed some vaginal discharge and went to see her OB/GYN. Her OB/GYN did a pelvic exam \"told me she thought I had a sexually transmitted infection gave me a shot my butt and started me on this antibiotic (nitrofurantoin) for urinary tract infection. And then later than 10 days ago exposed to the same from sex then it limits had sex and then got a bit of symptoms again so I went to [facility] urgent care. There they checked things and said the the urine looked okay but that I needed something different besides a shot for the STI and they started me on another antibiotic (doxycycline), which I have been taking and am about skilled nursing through\" patient states she is also had several days of mid thoracic back pain intermittent worse with standing worse with certain positions atraumatic. Patient states she has had no abdominal pains but has noted some \"odd discharge though it does not burn when she urinates and she is not having the discharge like she was previously. Patient states she has been afebrile has had no nausea vomiting no diarrhea other current systemic complaints.     pt denies HA, vison changes, diff swallowing, CP, SOB, Abd pain, F/Ch, N/V, D/Cons or other current systemic complaints    Social/ PSH reviewed in EMR    EMR Chart Reviewed           Past Medical History:   Diagnosis Date    Asthma     Migraine        No past surgical history on file. Family History:   Problem Relation Age of Onset    Migraines Mother        Social History     Socioeconomic History    Marital status: SINGLE     Spouse name: Not on file    Number of children: Not on file    Years of education: Not on file    Highest education level: Not on file   Occupational History    Not on file   Tobacco Use    Smoking status: Never Smoker    Smokeless tobacco: Never Used   Substance and Sexual Activity    Alcohol use: No     Comment: occasional     Drug use: No    Sexual activity: Yes     Birth control/protection: None   Other Topics Concern    Not on file   Social History Narrative    ** Merged History Encounter **          Social Determinants of Health     Financial Resource Strain:     Difficulty of Paying Living Expenses: Not on file   Food Insecurity:     Worried About Running Out of Food in the Last Year: Not on file    Tacho of Food in the Last Year: Not on file   Transportation Needs:     Lack of Transportation (Medical): Not on file    Lack of Transportation (Non-Medical):  Not on file   Physical Activity:     Days of Exercise per Week: Not on file    Minutes of Exercise per Session: Not on file   Stress:     Feeling of Stress : Not on file   Social Connections:     Frequency of Communication with Friends and Family: Not on file    Frequency of Social Gatherings with Friends and Family: Not on file    Attends Oriental orthodox Services: Not on file    Active Member of Clubs or Organizations: Not on file    Attends Club or Organization Meetings: Not on file    Marital Status: Not on file   Intimate Partner Violence:     Fear of Current or Ex-Partner: Not on file    Emotionally Abused: Not on file    Physically Abused: Not on file    Sexually Abused: Not on file   Housing Stability:     Unable to Pay for Housing in the Last Year: Not on file    Number of Places Lived in the Last Year: Not on file    Unstable Housing in the Last Year: Not on file         ALLERGIES: Grass pollen, Mold, Tree and shrub pollen, and Weed pollen    Review of Systems   Constitutional: Negative for appetite change, chills and fever. HENT: Negative for trouble swallowing and voice change. Respiratory: Negative for cough. Cardiovascular: Negative for chest pain and palpitations. Gastrointestinal: Negative for abdominal pain, diarrhea, nausea and vomiting. Genitourinary: Positive for flank pain, vaginal bleeding and vaginal discharge. Negative for decreased urine volume, dysuria and hematuria. Musculoskeletal: Positive for back pain. Skin: Negative for rash. Neurological: Negative for facial asymmetry and speech difficulty. Psychiatric/Behavioral: Negative for confusion. All other systems reviewed and are negative. Vitals:    02/19/22 0714   BP: 111/76   Pulse: 72   Resp: 18   Temp: 97.8 °F (36.6 °C)   SpO2: 100%   Weight: 52.2 kg (115 lb)   Height: 4' 11\" (1.499 m)            Physical Exam  Vitals and nursing note reviewed. Constitutional:       General: She is not in acute distress. Appearance: Normal appearance. She is well-developed. She is not ill-appearing, toxic-appearing or diaphoretic. Comments: NAD, AxOx4, speaking in complete sentences       HENT:      Head: Normocephalic and atraumatic. Right Ear: External ear normal.      Left Ear: External ear normal.   Eyes:      General: No scleral icterus. Right eye: No discharge. Left eye: No discharge. Conjunctiva/sclera: Conjunctivae normal.      Pupils: Pupils are equal, round, and reactive to light. Neck:      Vascular: No JVD. Trachea: No tracheal deviation. Cardiovascular:      Rate and Rhythm: Normal rate and regular rhythm. Pulses: Normal pulses. Heart sounds: Normal heart sounds. No murmur heard. No friction rub. No gallop. Pulmonary:      Effort: Pulmonary effort is normal. No respiratory distress. Breath sounds: Normal breath sounds. No stridor. No wheezing, rhonchi or rales. Chest:      Chest wall: No tenderness. Abdominal:      General: Bowel sounds are normal.      Palpations: Abdomen is soft. Tenderness: There is no abdominal tenderness. There is no guarding or rebound. Hernia: No hernia is present. Comments: nttp       Genitourinary:     Vagina: Vaginal discharge present. Musculoskeletal:         General: No tenderness, deformity or signs of injury. Normal range of motion. Cervical back: Normal range of motion and neck supple. Right lower leg: No edema. Left lower leg: No edema. Skin:     General: Skin is warm and dry. Capillary Refill: Capillary refill takes less than 2 seconds. Coloration: Skin is not pale. Findings: No erythema or rash. Neurological:      General: No focal deficit present. Mental Status: She is alert and oriented to person, place, and time. Cranial Nerves: No cranial nerve deficit. Sensory: No sensory deficit. Motor: No weakness or abnormal muscle tone. Coordination: Coordination normal.      Gait: Gait normal.      Deep Tendon Reflexes: Reflexes normal.   Psychiatric:         Behavior: Behavior normal.         Thought Content: Thought content normal.          MDM       Procedures    7:54 AM  As patient has been treated with Rocephin is currently on doxycycline and had finished a course of nitrofurantoin we will check a UA we will check a GC from the UA. Will defer pelvic exam to her OB/GYN obtain ultrasounds/will obtain a chest x-ray to evaluate the midthoracic back pain/patient agrees with this plan. 9:00 AM'  Test results thus far with patient.  Pt also informed of risk of HIV/ Hepatitis and need for follow-up testing w/ health dept/ need to inform partner/ have them tested also; patient adds Gino Eason was tested the day after I was not treated. \" Patient was offered/refused repeat pelvic exam.  Discussed her current antibiotic regimen and appropriateness of the treatment. We are awaiting ultrasound results she agrees with OB/GYN follow-up if normal ultrasound results. 9:33 AM  Anil Meredith  results have been reviewed with her. She has been counseled regarding her diagnosis. She verbally conveys understanding and agreement of the signs, symptoms, diagnosis, treatment and prognosis and additionally agrees to Call/ Arrange follow up as recommended with Dr. Lorena Mcelroy MD in 24 - 48 hours. She also agrees with the care-plan and conveys that all of her questions have been answered. I have also put together some discharge instructions for her that include: 1) educational information regarding their diagnosis, 2) how to care for their diagnosis at home, as well a 3) list of reasons why they would want to return to the ED prior to their follow-up appointment, should their condition change or for concerns.

## 2022-07-04 ENCOUNTER — HOSPITAL ENCOUNTER (EMERGENCY)
Age: 27
Discharge: HOME | End: 2022-07-04
Attending: EMERGENCY MEDICINE
Payer: MEDICAID

## 2022-07-04 VITALS
SYSTOLIC BLOOD PRESSURE: 126 MMHG | HEART RATE: 100 BPM | BODY MASS INDEX: 27.51 KG/M2 | TEMPERATURE: 99.3 F | DIASTOLIC BLOOD PRESSURE: 77 MMHG | OXYGEN SATURATION: 100 % | WEIGHT: 136.47 LBS | HEIGHT: 59 IN | RESPIRATION RATE: 18 BRPM

## 2022-07-04 DIAGNOSIS — R11.0 NAUSEA WITHOUT VOMITING: ICD-10-CM

## 2022-07-04 DIAGNOSIS — U07.1 COVID-19: Primary | ICD-10-CM

## 2022-07-04 LAB
COVID-19 RAPID TEST, COVR: DETECTED
DEPRECATED S PYO AG THROAT QL EIA: NEGATIVE
FLUAV AG NPH QL IA: NEGATIVE
FLUBV AG NOSE QL IA: NEGATIVE
SOURCE, COVRS: ABNORMAL

## 2022-07-04 PROCEDURE — 87635 SARS-COV-2 COVID-19 AMP PRB: CPT

## 2022-07-04 PROCEDURE — 99284 EMERGENCY DEPT VISIT MOD MDM: CPT

## 2022-07-04 PROCEDURE — 74011250636 HC RX REV CODE- 250/636: Performed by: PHYSICIAN ASSISTANT

## 2022-07-04 PROCEDURE — 96375 TX/PRO/DX INJ NEW DRUG ADDON: CPT

## 2022-07-04 PROCEDURE — 87070 CULTURE OTHR SPECIMN AEROBIC: CPT

## 2022-07-04 PROCEDURE — 87804 INFLUENZA ASSAY W/OPTIC: CPT

## 2022-07-04 PROCEDURE — 87880 STREP A ASSAY W/OPTIC: CPT

## 2022-07-04 PROCEDURE — 96374 THER/PROPH/DIAG INJ IV PUSH: CPT

## 2022-07-04 RX ORDER — ACETAMINOPHEN 325 MG/1
650 TABLET ORAL
Qty: 30 TABLET | Refills: 0 | Status: SHIPPED | OUTPATIENT
Start: 2022-07-04 | End: 2022-07-09

## 2022-07-04 RX ORDER — ONDANSETRON 4 MG/1
4 TABLET, ORALLY DISINTEGRATING ORAL
Qty: 15 TABLET | Refills: 0 | Status: SHIPPED | OUTPATIENT
Start: 2022-07-04 | End: 2022-07-09

## 2022-07-04 RX ORDER — ONDANSETRON 2 MG/ML
4 INJECTION INTRAMUSCULAR; INTRAVENOUS ONCE
Status: COMPLETED | OUTPATIENT
Start: 2022-07-04 | End: 2022-07-04

## 2022-07-04 RX ORDER — KETOROLAC TROMETHAMINE 30 MG/ML
15 INJECTION, SOLUTION INTRAMUSCULAR; INTRAVENOUS ONCE
Status: COMPLETED | OUTPATIENT
Start: 2022-07-04 | End: 2022-07-04

## 2022-07-04 RX ADMIN — ONDANSETRON 4 MG: 2 INJECTION INTRAMUSCULAR; INTRAVENOUS at 22:24

## 2022-07-04 RX ADMIN — KETOROLAC TROMETHAMINE 15 MG: 30 INJECTION, SOLUTION INTRAMUSCULAR at 22:24

## 2022-07-04 NOTE — Clinical Note
[address]  [facility] EMERGENCY DEPT  [address] [place] [address]  Keesha Lizarraga [id number] [phone]    Work/School Note    Date: 7/4/2022    To Whom It May concern:    Tiffany Valente was seen and treated today in the emergency room by the following provider(s):  Attending Provider: Donna Kim MD  Physician Assistant: YVONNE Cortez. Tiffany Valente is excused from work/school on 7/4/2022 through 7/6/2022. She is medically clear to return to work/school on 7/7/2022.          Sincerely,          YVONNE Lynne

## 2022-07-05 NOTE — DISCHARGE INSTRUCTIONS
You should follow the CDC guidance on the CDC. gov website for further information regarding self quarantine. You may return to the emergency department if you have any new concerning symptoms, to include difficulty breathing, persistent vomiting, chest pain, or abdominal pain.

## 2022-07-05 NOTE — ED PROVIDER NOTES
EMERGENCY DEPARTMENT HISTORY AND PHYSICAL EXAM      Date: 7/4/2022  Patient Name: Nyla Baum    History of Presenting Illness     Chief Complaint   Patient presents with    Sore Throat     Patient arrives with complaint of sore throat, body aches and chills starting earlier today.  Chills    Generalized Body Aches       History Provided By: Patient    HPI: Nyla Baum, 27 y.o. female with a past medical history of asthma who presents emergency department with a constellation of multiple complaints. She woke up this morning feeling fine then after taking a nap began having a headache, myalgias, fatigue, chills and sore throat. She is accompanied by her daughter who was had a cough for the past few days. She is feeling nauseous but denies any abdominal pain or diarrhea. No cough. She feels like her breathing is \"labored\"but denies any chest pain. She has not been vaccinated against COVID-19. There are no other complaints, changes, or physical findings at this time. PCP: Kevin Mensah MD    No current facility-administered medications on file prior to encounter. Current Outpatient Medications on File Prior to Encounter   Medication Sig Dispense Refill    Arm Brace (Wrist Brace) [employer] Fit and dispense wrist braces for both wrists, to wear nightly for hand tingling. 2 Each 2    albuterol (PROVENTIL HFA, VENTOLIN HFA, PROAIR HFA) 90 mcg/actuation inhaler Take 2 Puffs by inhalation every four (4) hours as needed for Wheezing. 1 Inhaler 2       Past History     Past Medical History:  Past Medical History:   Diagnosis Date    Asthma     Migraine        Past Surgical History:  No past surgical history on file.     Family History:  Family History   Problem Relation Age of Onset   Sam Loser Migraines Mother        Social History:  Social History     Tobacco Use    Smoking status: Never Smoker    Smokeless tobacco: Never Used   Substance Use Topics    Alcohol use: No     Comment: occasional     Drug use: No       Allergies: Allergies   Allergen Reactions    Grass Pollen Sneezing    Mold Sneezing    Tree And Shrub Pollen Sneezing    Weed Pollen Hives and Sneezing         Review of Systems   Review of Systems   Constitutional: Positive for chills and fatigue. Negative for fever. HENT: Positive for sore throat. Negative for congestion. Respiratory: Positive for shortness of breath. Negative for cough. Cardiovascular: Negative for chest pain. Gastrointestinal: Positive for nausea. Negative for abdominal pain, diarrhea and vomiting. Genitourinary: Negative for dysuria and hematuria. Musculoskeletal: Negative for myalgias. Skin: Negative for rash. Neurological: Negative for headaches. All other systems reviewed and are negative. Physical Exam   Physical Exam  Vitals and nursing note reviewed. Constitutional:       General: She is not in acute distress. Appearance: She is not toxic-appearing. Comments: Patient appears ill, but nontoxic   HENT:      Head: Atraumatic. Right Ear: External ear normal.      Left Ear: External ear normal.      Nose: Nose normal.      Mouth/Throat:      Mouth: Mucous membranes are moist.      Comments: No tripoding, drooling, stridor. Oropharynx normal to inspection. Uvula midline with no asymmetry or signs of peritonsillar abscess. Tongue normal to inspection with no swelling or erythema. No sublingual erythema or induration. Neck normal to inspection with no bulging, anterior tenderness or redness, or pain with active/passive range of motion. Eyes:      Extraocular Movements: Extraocular movements intact. Conjunctiva/sclera: Conjunctivae normal.   Cardiovascular:      Rate and Rhythm: Normal rate and regular rhythm. Pulses: Normal pulses. Heart sounds: Normal heart sounds. No murmur heard. No friction rub. No gallop. Pulmonary:      Effort: Pulmonary effort is normal. No respiratory distress.       Breath sounds: Normal breath sounds. No stridor. No wheezing, rhonchi or rales. Comments: Patient speaking full sentences with no increased work of breathing or signs of respiratory distress. Lungs clear to auscultation. No cough noted during the course of examination  Abdominal:      General: Abdomen is flat. There is no distension. Palpations: Abdomen is soft. Tenderness: There is no abdominal tenderness. There is no guarding or rebound. Comments: Abdomen soft, nontender. No guarding or rigidity. Musculoskeletal:         General: No swelling. Cervical back: Neck supple. Skin:     General: Skin is warm. Neurological:      Mental Status: She is alert and oriented to person, place, and time. Psychiatric:         Mood and Affect: Mood normal.         Behavior: Behavior normal.         Thought Content: Thought content normal.         Judgment: Judgment normal.         Diagnostic Study Results     Labs -   No results found for this or any previous visit (from the past 12 hour(s)). Radiologic Studies -   No orders to display     CT Results  (Last 48 hours)    None        CXR Results  (Last 48 hours)    None            Medical Decision Making   I am the first provider for this patient. I reviewed the vital signs, available nursing notes, past medical history, past surgical history, family history and social history. Vital Signs-Reviewed the patient's vital signs. Patient Vitals for the past 12 hrs:   Temp Pulse Resp BP SpO2   07/04/22 2148 99.3 °F (37.4 °C) 100 18 126/77 100 %       Records Reviewed: Nursing Notes    Provider Notes (Medical Decision Making):   Patient evaluated for generalized fatigue, myalgias and sore throat. Patient tested positive for COVID. Rapid strep was negative and she had no concerning features for an emergent ENT process, including peritonsillar abscess, retropharyngeal abscess, epiglottitis.   Patient had no cough, unremarkable breath sounds, and aside from a heart rate of 100 which is likely secondary to dehydration, and no concerning vital signs. Low concern for an overlying bacterial pneumonia. Patient endorsed some mild nausea, but had no abdominal pain or tenderness. Low concern for intra-abdominal process and her nausea is likely secondary to her viral illness. Patient given fluids and Zofran and states that she was feeling better and requesting to go home. Patient was advised on symptomatic care, self quarantine and return precautions to the emergency department. She was advised PCP follow-up. Patient expressed understanding of the discharge instructions treatment plan    ED Course:   Initial assessment performed. The patients presenting problems have been discussed, and they are in agreement with the care plan formulated and outlined with them. I have encouraged them to ask questions as they arise throughout their visit. Critical Care Time: None    Disposition:  discharged    PLAN:  1. Current Discharge Medication List        2. Follow-up Information    None       Return to ED if worse     Diagnosis     Clinical Impression: No diagnosis found. Please note that this dictation was completed with [employer], the computer voice recognition software. Quite often unanticipated grammatical, syntax, homophones, and other interpretive errors are inadvertently transcribed by the computer software. Please disregards these errors. Please excuse any errors that have escaped final proofreading.

## 2022-07-05 NOTE — ED NOTES
Discharge papers provided and explained, pt verbalized understanding. Pt ambulatory at discharge, no distress noted.

## 2022-07-06 ENCOUNTER — PATIENT OUTREACH (OUTPATIENT)
Dept: CASE MANAGEMENT | Age: 27
End: 2022-07-06

## 2022-07-06 NOTE — PROGRESS NOTES
Patient contacted regarding COVID-19 diagnosis. Discussed COVID-19 related testing which was available at this time. Test results were positive. Patient informed of results, if available? yes. LPN Care Coordinator contacted the patient by telephone to perform post discharge assessment. Call within 2 business days of discharge: Yes Verified name and  with patient as identifiers. Provided introduction to self, and explanation of the CTN/ACM role, and reason for call due to risk factors for infection and/or exposure to COVID-19. Symptoms reviewed with patient who verbalized the following symptoms: no new symptoms and no worsening symptoms      Due to no new or worsening symptoms encounter was not routed to provider for escalation. Discussed follow-up appointments. If no appointment was previously scheduled, appointment scheduling offered:  no. [facility] follow up appointment(s): No future appointments. Non-[facility] follow up appointment(s): n/a  Interventions to address risk factors: Reviewed and followed up on pending diagnostic tests and treatments-COVID-19  and Education of patient/family/caregiver/guardian to support self-management-patient to follow up with PCP as needed     Advance Care Planning:   Does patient have an Advance Directive: not on file. Educated patient about risk for severe COVID-19 due to risk factors according to CDC guidelines. LPN CC reviewed discharge instructions, medical action plan and red flag symptoms with the patient who verbalized understanding. Discussed COVID vaccination status: yes. Education provided on COVID-19 vaccination as appropriate. Discussed exposure protocols and quarantine with CDC Guidelines. Patient was given an opportunity to verbalize any questions and concerns and agrees to contact LPN CC or health care provider for questions related to their healthcare.     Reviewed and educated patient on any new and changed medications related to discharge diagnosis     Was patient discharged with a pulse oximeter? no    LPN CC provided contact information. Plan for follow-up call in 5-7 days based on severity of symptoms and risk factors.

## 2022-07-07 LAB
BACTERIA SPEC CULT: NORMAL
SERVICE CMNT-IMP: NORMAL

## 2022-07-12 ENCOUNTER — TELEPHONE (OUTPATIENT)
Dept: INTERNAL MEDICINE CLINIC | Age: 27
End: 2022-07-12

## 2022-07-12 NOTE — TELEPHONE ENCOUNTER
Future Appointments:  Future Appointments   Date Time Provider Jason Aileen   7/14/2022  4:00 PM Yeison Salas MD [facility]        Last Appointment With Me:  Visit date not found     Above virtual appt booked to discuss covid symptoms.

## 2022-07-12 NOTE — TELEPHONE ENCOUNTER
----- Message from Aminata Justin sent at 7/11/2022  3:56 PM EDT -----  Subject: Message to Provider    QUESTIONS  Information for Provider? URGENT - Pt is calling to receive an update on   getting an appointment for her and her daughter.  Please contact   information as soon as possible.   ---------------------------------------------------------------------------  --------------  Orquidea SMITH  [phone]; OK to leave message on voicemail  ---------------------------------------------------------------------------  --------------  SCRIPT ANSWERS  undefined

## 2022-07-13 ENCOUNTER — PATIENT OUTREACH (OUTPATIENT)
Dept: CASE MANAGEMENT | Age: 27
End: 2022-07-13

## 2022-07-13 NOTE — PROGRESS NOTES
Patient resolved from Transition of Care episode on 7/13/2022. ACM/CTN was unsuccessful at contacting this patient today. Patient/family was provided the following resources and education related to COVID-19 during the initial call:                         Signs, symptoms and red flags related to COVID-19            CDC exposure and quarantine guidelines            [facility] exposure contact - [phone]            Contact for their local Department of Health                 Patient has not had any additional ED or hospital visits. No further outreach scheduled with this CTN/ACM. Episode of Care resolved. Patient has this CTN/ACM contact information if future needs arise.

## 2022-07-14 ENCOUNTER — VIRTUAL VISIT (OUTPATIENT)
Dept: INTERNAL MEDICINE CLINIC | Age: 27
End: 2022-07-14
Payer: MEDICAID

## 2022-07-14 DIAGNOSIS — U07.1 COVID-19: Primary | ICD-10-CM

## 2022-07-14 PROCEDURE — 99212 OFFICE O/P EST SF 10 MIN: CPT | Performed by: INTERNAL MEDICINE

## 2022-07-14 NOTE — PROGRESS NOTES
Erica Shah (: 1995) is a 27 y.o. female, established patient, here for evaluation of the following chief complaint(s):   Cough (Covid positive 22)       ASSESSMENT/PLAN:  Below is the assessment and plan developed based on review of pertinent history, labs, studies, and medications. 1. COVID-19   - improving no red flag symptoms. - reviewed ongoing supportive care   - letter written and sent via [employer]. SUBJECTIVE/OBJECTIVE:  HPI   - 27year old woman  First episode of covid. - considers that she may have had covid prior    - tested positive on  - symptoms also started that day. - started with a tickle in her throat, was worried she may be having an allergic response. - improving overall.    - shortness of breath with walking. No chest pain at this time. - overall feeling improved. Has been at home for prolonged time due to daughter starting with covid illness on 2022 and then her daughter having the same. requeting letters to be written for return to work/school    Review of Systems   HENT: Positive for congestion. Respiratory: Positive for cough and shortness of breath. Negative for chest tightness. No data recorded     Physical Exam  Vitals reviewed. Constitutional:       General: She is not in acute distress. Appearance: Normal appearance. HENT:      Head: Normocephalic. Nose: Rhinorrhea present. Eyes:      Pupils: Pupils are equal, round, and reactive to light. Pulmonary:      Effort: Pulmonary effort is normal.   Musculoskeletal:      Cervical back: Normal range of motion. Neurological:      Mental Status: She is alert and oriented to person, place, and time. Psychiatric:         Mood and Affect: Mood normal.         Behavior: Behavior normal.         Thought Content: Thought content normal.       Erica Shah, was evaluated through a synchronous (real-time) audio-video encounter.  The patient (or guardian if applicable) is aware that this is a billable service, which includes applicable co-pays. This Virtual Visit was conducted with patient's (and/or legal guardian's) consent. The visit was conducted pursuant to the emergency declaration under the [facility], [address] [place] [address] authority and the [employer] and [employer] Act. Patient identification was verified, and a caregiver was present when appropriate. The patient was located at: Home: [address] [facility] [address]  The provider was located at: [facility] [place] [phone]       An electronic signature was used to authenticate this note.   -- Jordi Santiago MD

## 2022-07-14 NOTE — LETTER
NOTIFICATION RETURN TO WORK / SCHOOL    7/14/2022 5:06 PM    Ms. Maylin Aguayo  [address] [facility] [address]      To Whom It May Concern:    Maylin Aguayo is currently under the care of [facility]. She tested positive for covid on July 4th, with symptoms starting on July 4th. She needed to complete a 5 day quarantine (ending 7/9/2022). After this time she should wear a mask until a minimum of 10 days completed after symptoms started. If there are questions or concerns please have the patient contact our office.         Sincerely,      Alphonsa Hatchet, MD

## 2022-07-14 NOTE — PROGRESS NOTES
RM Virtual visit    No chief complaint on file. There were no vitals taken for this visit. 3 most recent PHQ Screens 11/22/2021   Little interest or pleasure in doing things Not at all   Feeling down, depressed, irritable, or hopeless Not at all   Total Score PHQ 2 0         1. Have you been to the ER, urgent care clinic since your last visit? Hospitalized since your last visit? Yes pt was seen in Er on 7/4/22    2. Have you seen or consulted any other health care providers outside of the [address] [place] [address] since your last visit? Include any pap smears or colon screening. No    Health Maintenance Due   Topic Date Due    Hepatitis C Screening  Never done    COVID-19 Vaccine (1) Never done    DTaP/Tdap/Td series (1 - Tdap) Never done    Pap Smear  Never done       Learning Assessment 10/2/2018   PRIMARY LEARNER Patient   BARRIERS PRIMARY LEARNER NONE   PRIMARY LANGUAGE ENGLISH   LEARNER PREFERENCE PRIMARY DEMONSTRATION   ANSWERED BY patient   RELATIONSHIP SELF         AVS  education, follow up, and recommendations provided and addressed with patient.  services used to advise patient . NO

## 2022-10-19 ENCOUNTER — OFFICE VISIT (OUTPATIENT)
Dept: INTERNAL MEDICINE CLINIC | Age: 27
End: 2022-10-19
Payer: MEDICAID

## 2022-10-19 VITALS
HEIGHT: 59 IN | SYSTOLIC BLOOD PRESSURE: 105 MMHG | BODY MASS INDEX: 22.3 KG/M2 | TEMPERATURE: 98.1 F | WEIGHT: 110.6 LBS | RESPIRATION RATE: 16 BRPM | OXYGEN SATURATION: 100 % | DIASTOLIC BLOOD PRESSURE: 67 MMHG | HEART RATE: 66 BPM

## 2022-10-19 DIAGNOSIS — M54.50 RIGHT LUMBAR PAIN: Primary | ICD-10-CM

## 2022-10-19 DIAGNOSIS — H92.03 ACUTE EAR PAIN, BILATERAL: ICD-10-CM

## 2022-10-19 DIAGNOSIS — L81.9 HYPOPIGMENTED SKIN LESION: ICD-10-CM

## 2022-10-19 PROCEDURE — 99214 OFFICE O/P EST MOD 30 MIN: CPT | Performed by: INTERNAL MEDICINE

## 2022-10-19 RX ORDER — METHOCARBAMOL 500 MG/1
500 TABLET, FILM COATED ORAL
Qty: 40 TABLET | Refills: 1 | Status: SHIPPED | OUTPATIENT
Start: 2022-10-19

## 2022-10-19 RX ORDER — NAPROXEN 500 MG/1
500 TABLET ORAL 2 TIMES DAILY WITH MEALS
Qty: 30 TABLET | Refills: 0 | Status: SHIPPED | OUTPATIENT
Start: 2022-10-19 | End: 2022-10-26

## 2022-10-19 NOTE — PROGRESS NOTES
RM 18       Chief Complaint   Patient presents with    Back Pain     Concerns also with skin and ear pain             1. Have you been to the ER, urgent care clinic since your last visit? Hospitalized since your last visit? No    2. Have you seen or consulted any other health care providers outside of the [address] [place] [address] since your last visit? Include any pap smears or colon screening.  No        Visit Vitals  /67 (BP 1 Location: Left upper arm, BP Patient Position: Sitting, BP Cuff Size: Adult)   Pulse 66   Temp 98.1 °F (36.7 °C) (Oral)   Resp 16   Ht 4' 11\" (1.499 m)   Wt 110 lb 9.6 oz (50.2 kg)   SpO2 100%   BMI 22.34 kg/m²

## 2022-10-19 NOTE — PATIENT INSTRUCTIONS
1.  Please follow the following instructions to process/authorize your referral, if needed:    Referrals processing  Please verify with your insurance IF you need referral authorization submitted. For insurance plans which require this, please follow the following steps. FAILURE TO DO SO MAY RESULT IN INABILITY TO SEE THE SPECIALIST YOU HAVE BEEN REFERRED TO (once you are scheduled to see them). Call and schedule appointment with specialist  Call our clinic and leave message with provider name, and date of appointment  We will then submit the referral to your insurance. This process takes 2-5 business days. If you have questions about scheduling or authorizing referral, you can review with our referral coordinator. You can review with her today if available/if you have time, or you can call to review once you have made your referral/appointment. If you are not sure if you need referral authorizations, please review with the referral coordinator(s), either prior to or after you have made the appointment, as reviewed. 2.  If you need help finding a dermatologist covered by your insurance    --You can call [facility], [address] [place] [address] Dermatology or [address] [facility], for dermatology evaluation      --You can also contact your insurance to see which providers are covered prior to calling for an appointment.
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
## 2024-01-04 ENCOUNTER — NURSE TRIAGE (OUTPATIENT)
Dept: OTHER | Facility: CLINIC | Age: 29
End: 2024-01-04

## 2024-01-04 NOTE — TELEPHONE ENCOUNTER
Location of patient: [place]    Received call from Claudia at [facility] with Red Flag Complaint.    Subjective: Caller states \"I had an episode of feeling lightheaded and jittery. I have had these when I dont eat breakfast but today I ate waffles and felt that way 3 hours later\"     Current Symptoms: Intermittent lightheadedness and jittery. Pt notices more if she is not eating well that day. Symptoms will resolve after eating something small. No hx of diabetes. Pt does not check glucose or know what glucose level is when symptoms start.     Onset: 1 year ago; intermittent    Associated Symptoms: NA    Pain Severity: 0/10; N/A; none    Temperature: NA     What has been tried: eating small snack will help with symptoms    Recommended disposition:  Discuss with PCP and callback by nurse within one hour    Care advice provided, patient verbalizes understanding; denies any other questions or concerns; instructed to call back for any new or worsening symptoms.    Writer provided warm transfer to [facility] and  for further assistance.     Attention Provider:  Thank you for allowing me to participate in the care of your patient.  The patient was connected to triage in response to information provided to the [facility].  Please do not respond through this encounter as the response is not directed to a shared pool.      Reason for Disposition   Low blood glucose (70 mg/dl [3.9 mmol/l] or below) OR symptomatic, now improved with Care Advice AND cause unknown    Protocols used: Diabetes - Low Blood Sugar-ADULT-OH

## 2024-01-04 NOTE — TELEPHONE ENCOUNTER
Patient calling with complaints of low blood sugar (possibly)    Patient Glucose Reading:unable to obtain  How long has your glucose reading been low/elevated: unverifiable  When was reading obtained: none taken  What have you eaten/drank today: moe vargasies  Experiencing any symptoms: was light-headed, sweating and jittery until cookies (ate w/in hour)  How long have you been experiencing symptoms: since this morning      Anything lower than 70 and patient present with symptoms, please notify clinical staff    Anything above 200 and patient present with symptoms, please notify clinical staff.     Scheduled OV w/ provider 1/30/24. Pt also concerned about BP, but no readings were taken. Pt declined to have CB from provider/nurse.

## 2024-01-28 ENCOUNTER — APPOINTMENT (OUTPATIENT)
Facility: HOSPITAL | Age: 29
End: 2024-01-28
Payer: MEDICAID

## 2024-01-28 ENCOUNTER — HOSPITAL ENCOUNTER (OUTPATIENT)
Facility: HOSPITAL | Age: 29
Setting detail: OBSERVATION
Discharge: HOME | End: 2024-01-29
Attending: EMERGENCY MEDICINE | Admitting: HOSPITALIST
Payer: MEDICAID

## 2024-01-28 DIAGNOSIS — R53.1 LEFT-SIDED WEAKNESS: Primary | ICD-10-CM

## 2024-01-28 DIAGNOSIS — R29.898 LEFT ARM WEAKNESS: ICD-10-CM

## 2024-01-28 LAB
ALBUMIN SERPL-MCNC: 4.1 G/DL (ref 3.5–5)
ALBUMIN/GLOB SERPL: 1.2 (ref 1.1–2.2)
ALP SERPL-CCNC: 48 U/L (ref 45–117)
ALT SERPL-CCNC: 18 U/L (ref 12–78)
ANION GAP SERPL CALC-SCNC: 4 MMOL/L (ref 5–15)
AST SERPL-CCNC: 14 U/L (ref 15–37)
BASOPHILS # BLD: 0 K/UL (ref 0–0.1)
BASOPHILS NFR BLD: 1 % (ref 0–1)
BILIRUB SERPL-MCNC: 0.6 MG/DL (ref 0.2–1)
BUN SERPL-MCNC: 10 MG/DL (ref 6–20)
BUN/CREAT SERPL: 13 (ref 12–20)
CALCIUM SERPL-MCNC: 8.5 MG/DL (ref 8.5–10.1)
CHLORIDE SERPL-SCNC: 102 MMOL/L (ref 97–108)
CHOLEST SERPL-MCNC: 142 MG/DL
CO2 SERPL-SCNC: 27 MMOL/L (ref 21–32)
COMMENT:: NORMAL
CREAT SERPL-MCNC: 0.78 MG/DL (ref 0.55–1.02)
DIFFERENTIAL METHOD BLD: NORMAL
EOSINOPHIL # BLD: 0.2 K/UL (ref 0–0.4)
EOSINOPHIL NFR BLD: 3 % (ref 0–7)
ERYTHROCYTE [DISTWIDTH] IN BLOOD BY AUTOMATED COUNT: 12.2 % (ref 11.5–14.5)
GLOBULIN SER CALC-MCNC: 3.3 G/DL (ref 2–4)
GLUCOSE BLD STRIP.AUTO-MCNC: 80 MG/DL (ref 65–117)
GLUCOSE SERPL-MCNC: 84 MG/DL (ref 65–100)
HCG UR QL: NEGATIVE
HCT VFR BLD AUTO: 35.8 % (ref 35–47)
HDLC SERPL-MCNC: 79 MG/DL
HDLC SERPL: 1.8 (ref 0–5)
HGB BLD-MCNC: 12.4 G/DL (ref 11.5–16)
IMM GRANULOCYTES # BLD AUTO: 0 K/UL (ref 0–0.04)
IMM GRANULOCYTES NFR BLD AUTO: 0 % (ref 0–0.5)
INR PPP: 1.2 (ref 0.9–1.1)
LDLC SERPL CALC-MCNC: 57.4 MG/DL (ref 0–100)
LYMPHOCYTES # BLD: 2.5 K/UL (ref 0.8–3.5)
LYMPHOCYTES NFR BLD: 34 % (ref 12–49)
MCH RBC QN AUTO: 31.7 PG (ref 26–34)
MCHC RBC AUTO-ENTMCNC: 34.6 G/DL (ref 30–36.5)
MCV RBC AUTO: 91.6 FL (ref 80–99)
MONOCYTES # BLD: 0.4 K/UL (ref 0–1)
MONOCYTES NFR BLD: 6 % (ref 5–13)
NEUTS SEG # BLD: 4.2 K/UL (ref 1.8–8)
NEUTS SEG NFR BLD: 56 % (ref 32–75)
NRBC # BLD: 0 K/UL (ref 0–0.01)
NRBC BLD-RTO: 0 PER 100 WBC
PLATELET # BLD AUTO: 222 K/UL (ref 150–400)
PMV BLD AUTO: 9.4 FL (ref 8.9–12.9)
POTASSIUM SERPL-SCNC: 3.7 MMOL/L (ref 3.5–5.1)
PROT SERPL-MCNC: 7.4 G/DL (ref 6.4–8.2)
PROTHROMBIN TIME: 12.3 SEC (ref 9–11.1)
RBC # BLD AUTO: 3.91 M/UL (ref 3.8–5.2)
SERVICE CMNT-IMP: NORMAL
SODIUM SERPL-SCNC: 133 MMOL/L (ref 136–145)
SPECIMEN HOLD: NORMAL
TRIGL SERPL-MCNC: 28 MG/DL
TROPONIN I SERPL HS-MCNC: 5 NG/L (ref 0–51)
VLDLC SERPL CALC-MCNC: 5.6 MG/DL
WBC # BLD AUTO: 7.3 K/UL (ref 3.6–11)

## 2024-01-28 PROCEDURE — 81025 URINE PREGNANCY TEST: CPT

## 2024-01-28 PROCEDURE — 84484 ASSAY OF TROPONIN QUANT: CPT

## 2024-01-28 PROCEDURE — 99285 EMERGENCY DEPT VISIT HI MDM: CPT

## 2024-01-28 PROCEDURE — 85025 COMPLETE CBC W/AUTO DIFF WBC: CPT

## 2024-01-28 PROCEDURE — 36415 COLL VENOUS BLD VENIPUNCTURE: CPT

## 2024-01-28 PROCEDURE — 6360000004 HC RX CONTRAST MEDICATION: Performed by: EMERGENCY MEDICINE

## 2024-01-28 PROCEDURE — APPNB30 APP NON BILLABLE TIME 0-30 MINS: Performed by: NURSE PRACTITIONER

## 2024-01-28 PROCEDURE — 6370000000 HC RX 637 (ALT 250 FOR IP): Performed by: HOSPITALIST

## 2024-01-28 PROCEDURE — 6360000004 HC RX CONTRAST MEDICATION: Performed by: STUDENT IN AN ORGANIZED HEALTH CARE EDUCATION/TRAINING PROGRAM

## 2024-01-28 PROCEDURE — 70450 CT HEAD/BRAIN W/O DYE: CPT

## 2024-01-28 PROCEDURE — 80053 COMPREHEN METABOLIC PANEL: CPT

## 2024-01-28 PROCEDURE — 85610 PROTHROMBIN TIME: CPT

## 2024-01-28 PROCEDURE — 0042T CT BRAIN PERFUSION: CPT

## 2024-01-28 PROCEDURE — 96374 THER/PROPH/DIAG INJ IV PUSH: CPT

## 2024-01-28 PROCEDURE — 82962 GLUCOSE BLOOD TEST: CPT

## 2024-01-28 PROCEDURE — 70553 MRI BRAIN STEM W/O & W/DYE: CPT

## 2024-01-28 PROCEDURE — 2060000000 HC ICU INTERMEDIATE R&B

## 2024-01-28 PROCEDURE — 70498 CT ANGIOGRAPHY NECK: CPT

## 2024-01-28 PROCEDURE — 6360000002 HC RX W HCPCS: Performed by: EMERGENCY MEDICINE

## 2024-01-28 PROCEDURE — 80061 LIPID PANEL: CPT

## 2024-01-28 PROCEDURE — 70546 MR ANGIOGRAPH HEAD W/O&W/DYE: CPT

## 2024-01-28 PROCEDURE — 93005 ELECTROCARDIOGRAM TRACING: CPT | Performed by: EMERGENCY MEDICINE

## 2024-01-28 PROCEDURE — A9579 GAD-BASE MR CONTRAST NOS,1ML: HCPCS | Performed by: EMERGENCY MEDICINE

## 2024-01-28 RX ORDER — CIPROFLOXACIN 500 MG/1
500 TABLET, FILM COATED ORAL 2 TIMES DAILY
Status: ON HOLD | COMMUNITY
End: 2024-01-29 | Stop reason: HOSPADM

## 2024-01-28 RX ORDER — ALBUTEROL SULFATE 2.5 MG/3ML
2.5 SOLUTION RESPIRATORY (INHALATION) EVERY 6 HOURS PRN
Status: DISCONTINUED | OUTPATIENT
Start: 2024-01-28 | End: 2024-01-29 | Stop reason: HOSPADM

## 2024-01-28 RX ORDER — ASPIRIN 81 MG/1
81 TABLET, CHEWABLE ORAL DAILY
Status: DISCONTINUED | OUTPATIENT
Start: 2024-01-28 | End: 2024-01-29 | Stop reason: HOSPADM

## 2024-01-28 RX ORDER — KETOROLAC TROMETHAMINE 30 MG/ML
15 INJECTION, SOLUTION INTRAMUSCULAR; INTRAVENOUS
Status: COMPLETED | OUTPATIENT
Start: 2024-01-28 | End: 2024-01-28

## 2024-01-28 RX ORDER — SODIUM CHLORIDE 0.9 % (FLUSH) 0.9 %
10 SYRINGE (ML) INJECTION ONCE
Status: DISCONTINUED | OUTPATIENT
Start: 2024-01-28 | End: 2024-01-29 | Stop reason: HOSPADM

## 2024-01-28 RX ORDER — ASPIRIN 81 MG/1
324 TABLET, CHEWABLE ORAL
Status: DISCONTINUED | OUTPATIENT
Start: 2024-01-28 | End: 2024-01-28

## 2024-01-28 RX ORDER — METRONIDAZOLE 7.5 MG/G
1 GEL VAGINAL DAILY
Status: ON HOLD | COMMUNITY
End: 2024-01-29 | Stop reason: HOSPADM

## 2024-01-28 RX ADMIN — KETOROLAC TROMETHAMINE 15 MG: 30 INJECTION INTRAMUSCULAR; INTRAVENOUS at 17:17

## 2024-01-28 RX ADMIN — ASPIRIN 81 MG: 81 TABLET, CHEWABLE ORAL at 21:35

## 2024-01-28 RX ADMIN — GADOTERIDOL 10 ML: 279.3 INJECTION, SOLUTION INTRAVENOUS at 19:58

## 2024-01-28 RX ADMIN — IOPAMIDOL 80 ML: 755 INJECTION, SOLUTION INTRAVENOUS at 16:03

## 2024-01-28 RX ADMIN — IOPAMIDOL 40 ML: 755 INJECTION, SOLUTION INTRAVENOUS at 15:53

## 2024-01-28 ASSESSMENT — PAIN SCALES - GENERAL: PAINLEVEL_OUTOF10: 3

## 2024-01-28 ASSESSMENT — PAIN DESCRIPTION - LOCATION: LOCATION: HEAD

## 2024-01-28 ASSESSMENT — PAIN - FUNCTIONAL ASSESSMENT: PAIN_FUNCTIONAL_ASSESSMENT: NONE - DENIES PAIN

## 2024-01-28 NOTE — ED TRIAGE NOTES
Pt reports L arm weakness and intermittent numbness since this am. LKW was 2am. Pt denies CP and SOB.

## 2024-01-28 NOTE — ED PROVIDER NOTES
NASCET   criteria).                   CT BRAIN PERFUSION   Final Result   1. No large vessel occlusion.   2. No perfusion defect.   3. No hemodynamically significant extracranial ICA stenosis (using NASCET   criteria).                   CT HEAD WO CONTRAST   Final Result   No evidence of acute process.                 LABS:  Labs Reviewed   CBC WITH AUTO DIFFERENTIAL   EXTRA TUBES HOLD   COMPREHENSIVE METABOLIC PANEL   PROTIME-INR   TROPONIN   POCT GLUCOSE   POC PREGNANCY UR-QUAL   POCT GLUCOSE   POC PREGNANCY UR-QUAL       All other labs were within normal range or not returned as of this dictation.    EMERGENCY DEPARTMENT COURSE and DIFFERENTIAL DIAGNOSIS/MDM:   Vitals:    Vitals:    01/28/24 1538 01/28/24 1600 01/28/24 1700   BP: 121/83 113/84 105/75   Pulse: 94 77 84   Resp: 18 18 16   Temp: 98 °F (36.7 °C)     TempSrc: Tympanic     SpO2: 97% 100% 98%   Weight: 51.9 kg (114 lb 6.7 oz)     Height: 1.499 m (4' 11\")             Medical Decision Making  Amount and/or Complexity of Data Reviewed  Labs: ordered.  Radiology: ordered.  ECG/medicine tests: ordered. Decision-making details documented in ED Course.    Risk  Prescription drug management.  Decision regarding hospitalization.            REASSESSMENT     ED Course as of 01/28/24 1721   Sun Jan 28, 2024   1640 EKG 12 Lead  ED ECG interpretation:  Rhythm: normal sinus rhythm. Rate (approx.): 70.  Axis: normal.  ST segment:  No concerning ST elevations or depressions. This EKG was independently interpreted by Freddy Bauer MD,ED Provider.   [JM]      ED Course User Index  [JM] Freddy Bauer MD           CONSULTS:  None    PROCEDURES:  Unless otherwise noted below, none     Procedures      FINAL IMPRESSION      1. Left-sided weakness          DISPOSITION/PLAN   DISPOSITION Decision To Admit 01/28/2024 05:20:25 PM    Perfect Serve Consult for Admission  5:21 PM    ED Room Number: ER24/24  Patient Name and age:  Yoly Rueda 28 y.o.  female  Working Diagnosis:

## 2024-01-28 NOTE — PROGRESS NOTES
Neurocritical Care Code Stroke Documentation      Symptoms:   Left-sided numbness and left-sided weakness. She also reports feeling jittery and it feels like her head is spinning.   Patient denies Headache. Pt also reported feeling pain in her left hand when she went to be at 2 am, but denies having weakness or numbness at that time.   Last Known Well: 2 AM this morning    Medical hx: Reports no history of seizures. Has migraines, but normally takes Excedrin. She has not been following a neurologist for her migraines. She reports recently having spells of feeling \"loopy\" and lightheadedness since December and will be going in soon to see her PCP for further evaluation.      Past Medical History:   Diagnosis Date    Asthma     Migraine       Anticoagulation: None    VAN:   Negative   NIHSS:   1a-LOC:0    1b-Month/Age:0    1c-Open/Close Hand:0    2-Best Gaze:0    3-Visual Fields:0    4-Facial Palsy:0    5a-Left Arm:1    5b-Right Arm:0    6a-Left Le    6b-Right Le    7-Limb Ataxia:0    8-Sensory:1    9-Best Language:0    10-Dysarthria:0    11-Extinction/Inattention:0  TOTAL SCORE:3   Imaging:   CT: No evidence of acute process.    CTA/CTP: IMPRESSION:  1. No large vessel occlusion.  2. No perfusion defect.  3. No hemodynamically significant extracranial ICA stenosis (using NASCET criteria).   Plan:   TNK Candidate: NO    Mechanical thrombectomy Candidate: NO     Discussed with DR. Bauer.     Arrival time: 1551  Time spent: 30 minutes.     NAM Bynum - NP

## 2024-01-28 NOTE — PROGRESS NOTES
Admission Medication Reconciliation:    Information obtained from:   Patient  RxQuery data available¹:  Yes    Comments/Recommendations: Updated PTA meds/reviewed patient's allergies.    1)  Patient was a good historian. She is only taking albuterol PRN routinely. She started a course of antibiotics 1/24 for vaginal/urinary infection.    2)  Medication changes (since last review):  Added  - Cipo, Flagyl gel    Adjusted  - None    Removed  - Methocarbamol     ¹RxQuery pharmacy benefit data reflects medications filled and processed through the patient's insurance, however   this data does NOT capture whether the medication was picked up or is currently being taken by the patient.    Allergies:  Grass pollen(k-o-r-t-swt jone), Molds & smuts, and Tree extract    Significant PMH/Disease States:   Past Medical History:   Diagnosis Date    Asthma     Migraine      Chief Complaint for this Admission:    Chief Complaint   Patient presents with    Extremity Weakness     Prior to Admission Medications:   Prior to Admission Medications   Prescriptions Last Dose Informant   albuterol sulfate HFA (PROVENTIL;VENTOLIN;PROAIR) 108 (90 Base) MCG/ACT inhaler     Sig: Inhale 2 puffs into the lungs every 4 hours as needed   ciprofloxacin (CIPRO) 500 MG tablet     Sig: Take 1 tablet by mouth 2 times daily X 10 days   metroNIDAZOLE (METROGEL) 0.75 % vaginal gel     Sig: Place 1 Applicatorful vaginally daily X 5 days      Facility-Administered Medications: None     Please contact the main inpatient pharmacy with any questions or concerns at [phone] and we will direct you to the clinical pharmacist covering this patient's care while in-house.   Faith Cheema RP

## 2024-01-28 NOTE — H&P
[facility]  HISTORY AND PHYSICAL    Name:  OMARI CHAN  MR#:  [id number]  :  1995  ACCOUNT #:  [id number]  ADMIT DATE:  2024    PRESENTING COMPLAINT:  Left arm weakness with numbness, started this morning.    HISTORY OF PRESENT ILLNESS:  The patient is a 28-year-old female who had no chronic medical history except for mild asthma and migraines, presented to emergency room due to left arm weakness and numbness, which she noticed early this morning.  She woke up at 09:00 a.m.  She denies having any headache.  She also noticed some left leg weakness.  She had no fever or cough.  In the emergency room, CT scan of the head was done, which was negative for any acute finding.  CTA showed no large vessel occlusion or perfusion deficit.  The patient is admitted for further evaluation.    PAST MEDICAL HISTORY:  Significant for migraine and mild asthma.    SOCIOECONOMIC HISTORY:  The patient does not smoke.  She drinks socially.    FAMILY HISTORY:  Significant stroke in paternal family members.    REVIEW OF SYSTEMS:  Negative except as mentioned in the history of present illness.  All systems were reviewed.  No other positive finding was noted.    PHYSICAL EXAMINATION:  GENERAL:  On examination, the patient is a 28-year-old female, not in any acute distress.  VITAL SIGNS:  Temperature 98, blood pressure 121/83, pulse 77, respiratory rate 15, saturation is 100%.  HEENT:  Pupils equally reacting to light and accommodation.  NECK:  Supple.  There is no lymphadenopathy or JVD.  CHEST:  Clear.  No wheezing, no crackles.  CARDIOVASCULAR:  S1 and S2, regular.  No murmur.  No S3.  ABDOMEN:  No tenderness, no guarding, no rigidity.  Bowel sounds are active.  EXTREMITIES:  No pedal edema.  CNS:  The patient is alert, oriented, has normal strength on the right side, left arm has slight weakness, left leg has slight weakness.  No facial droop.  Sensory examination reveals slight decreased sensation on

## 2024-01-29 ENCOUNTER — TELEPHONE (OUTPATIENT)
Facility: HOSPITAL | Age: 29
End: 2024-01-29

## 2024-01-29 ENCOUNTER — APPOINTMENT (OUTPATIENT)
Facility: HOSPITAL | Age: 29
End: 2024-01-29
Attending: HOSPITALIST
Payer: MEDICAID

## 2024-01-29 VITALS
BODY MASS INDEX: 22.58 KG/M2 | HEIGHT: 59 IN | RESPIRATION RATE: 18 BRPM | HEART RATE: 82 BPM | WEIGHT: 112 LBS | OXYGEN SATURATION: 98 % | TEMPERATURE: 98.7 F | DIASTOLIC BLOOD PRESSURE: 65 MMHG | SYSTOLIC BLOOD PRESSURE: 114 MMHG

## 2024-01-29 PROBLEM — R53.1 LEFT-SIDED WEAKNESS: Status: ACTIVE | Noted: 2024-01-29

## 2024-01-29 LAB
ANION GAP SERPL CALC-SCNC: 0 MMOL/L (ref 5–15)
BASOPHILS # BLD: 0.1 K/UL (ref 0–0.1)
BASOPHILS NFR BLD: 1 % (ref 0–1)
BUN SERPL-MCNC: 16 MG/DL (ref 6–20)
BUN/CREAT SERPL: 19 (ref 12–20)
CALCIUM SERPL-MCNC: 7.8 MG/DL (ref 8.5–10.1)
CHLORIDE SERPL-SCNC: 107 MMOL/L (ref 97–108)
CO2 SERPL-SCNC: 27 MMOL/L (ref 21–32)
CREAT SERPL-MCNC: 0.85 MG/DL (ref 0.55–1.02)
DIFFERENTIAL METHOD BLD: ABNORMAL
ECHO AO ROOT DIAM: 3.1 CM
ECHO AO ROOT INDEX: 2.14 CM/M2
ECHO AV AREA PEAK VELOCITY: 3.2 CM2
ECHO AV AREA/BSA PEAK VELOCITY: 2.2 CM2/M2
ECHO AV PEAK GRADIENT: 4 MMHG
ECHO AV PEAK VELOCITY: 1 M/S
ECHO AV VELOCITY RATIO: 0.9
ECHO BSA: 1.47 M2
ECHO EST RA PRESSURE: 8 MMHG
ECHO LA DIAMETER INDEX: 1.86 CM/M2
ECHO LA DIAMETER: 2.7 CM
ECHO LA TO AORTIC ROOT RATIO: 0.87
ECHO LA VOL A-L A2C: 53 ML (ref 22–52)
ECHO LA VOL A-L A4C: 29 ML (ref 22–52)
ECHO LA VOL MOD A2C: 48 ML (ref 22–52)
ECHO LA VOL MOD A4C: 24 ML (ref 22–52)
ECHO LA VOLUME AREA LENGTH: 42 ML
ECHO LA VOLUME INDEX A-L A2C: 37 ML/M2 (ref 16–34)
ECHO LA VOLUME INDEX A-L A4C: 20 ML/M2 (ref 16–34)
ECHO LA VOLUME INDEX AREA LENGTH: 29 ML/M2 (ref 16–34)
ECHO LA VOLUME INDEX MOD A2C: 33 ML/M2 (ref 16–34)
ECHO LA VOLUME INDEX MOD A4C: 17 ML/M2 (ref 16–34)
ECHO LV E' LATERAL VELOCITY: 13 CM/S
ECHO LV E' SEPTAL VELOCITY: 7 CM/S
ECHO LV EJECTION FRACTION A2C: 58 %
ECHO LV EJECTION FRACTION A4C: 54 %
ECHO LV FRACTIONAL SHORTENING: 27 % (ref 28–44)
ECHO LV GLOBAL LONGITUDINAL STRAIN (GLS): -21.2 %
ECHO LV INTERNAL DIMENSION DIASTOLE INDEX: 3.31 CM/M2
ECHO LV INTERNAL DIMENSION DIASTOLIC: 4.8 CM (ref 3.9–5.3)
ECHO LV INTERNAL DIMENSION SYSTOLIC INDEX: 2.41 CM/M2
ECHO LV INTERNAL DIMENSION SYSTOLIC: 3.5 CM
ECHO LV IVSD: 0.8 CM (ref 0.6–0.9)
ECHO LV MASS 2D: 126.7 G (ref 67–162)
ECHO LV MASS INDEX 2D: 87.4 G/M2 (ref 43–95)
ECHO LV POSTERIOR WALL DIASTOLIC: 0.8 CM (ref 0.6–0.9)
ECHO LV RELATIVE WALL THICKNESS RATIO: 0.33
ECHO LVOT AREA: 3.8 CM2
ECHO LVOT DIAM: 2.2 CM
ECHO LVOT PEAK GRADIENT: 3 MMHG
ECHO LVOT PEAK VELOCITY: 0.9 M/S
ECHO MV A VELOCITY: 0.45 M/S
ECHO MV AREA PHT: 4.9 CM2
ECHO MV E DECELERATION TIME (DT): 155.9 MS
ECHO MV E VELOCITY: 0.61 M/S
ECHO MV E/A RATIO: 1.36
ECHO MV E/E' LATERAL: 4.69
ECHO MV E/E' RATIO (AVERAGED): 6.7
ECHO MV PRESSURE HALF TIME (PHT): 45.2 MS
ECHO PV MAX VELOCITY: 0.7 M/S
ECHO PV PEAK GRADIENT: 2 MMHG
ECHO RIGHT VENTRICULAR SYSTOLIC PRESSURE (RVSP): 22 MMHG
ECHO RV FREE WALL PEAK S': 15 CM/S
ECHO RV TAPSE: 1.8 CM (ref 1.7–?)
ECHO TV REGURGITANT MAX VELOCITY: 1.85 M/S
ECHO TV REGURGITANT PEAK GRADIENT: 14 MMHG
EKG ATRIAL RATE: 70 BPM
EKG DIAGNOSIS: NORMAL
EKG P AXIS: 44 DEGREES
EKG P-R INTERVAL: 144 MS
EKG Q-T INTERVAL: 374 MS
EKG QRS DURATION: 80 MS
EKG QTC CALCULATION (BAZETT): 403 MS
EKG R AXIS: 67 DEGREES
EKG T AXIS: 61 DEGREES
EKG VENTRICULAR RATE: 70 BPM
EOSINOPHIL # BLD: 0.4 K/UL (ref 0–0.4)
EOSINOPHIL NFR BLD: 5 % (ref 0–7)
ERYTHROCYTE [DISTWIDTH] IN BLOOD BY AUTOMATED COUNT: 15.6 % (ref 11.5–14.5)
EST. AVERAGE GLUCOSE BLD GHB EST-MCNC: 88 MG/DL
GLUCOSE SERPL-MCNC: 87 MG/DL (ref 65–100)
HBA1C MFR BLD: 4.7 % (ref 4–5.6)
HCT VFR BLD AUTO: 34.9 % (ref 35–47)
HGB BLD-MCNC: 11.6 G/DL (ref 11.5–16)
IMM GRANULOCYTES # BLD AUTO: 0 K/UL (ref 0–0.04)
IMM GRANULOCYTES NFR BLD AUTO: 0 % (ref 0–0.5)
LYMPHOCYTES # BLD: 2.8 K/UL (ref 0.8–3.5)
LYMPHOCYTES NFR BLD: 37 % (ref 12–49)
MCH RBC QN AUTO: 31.6 PG (ref 26–34)
MCHC RBC AUTO-ENTMCNC: 33.2 G/DL (ref 30–36.5)
MCV RBC AUTO: 95.1 FL (ref 80–99)
MONOCYTES # BLD: 0.5 K/UL (ref 0–1)
MONOCYTES NFR BLD: 6 % (ref 5–13)
NEUTS SEG # BLD: 4 K/UL (ref 1.8–8)
NEUTS SEG NFR BLD: 51 % (ref 32–75)
NRBC # BLD: 0 K/UL (ref 0–0.01)
NRBC BLD-RTO: 0 PER 100 WBC
PLATELET # BLD AUTO: 248 K/UL (ref 150–400)
PMV BLD AUTO: 10.3 FL (ref 8.9–12.9)
POTASSIUM SERPL-SCNC: ABNORMAL MMOL/L (ref 3.5–5.1)
RBC # BLD AUTO: 3.67 M/UL (ref 3.8–5.2)
SODIUM SERPL-SCNC: 134 MMOL/L (ref 136–145)
WBC # BLD AUTO: 7.8 K/UL (ref 3.6–11)

## 2024-01-29 PROCEDURE — 93306 TTE W/DOPPLER COMPLETE: CPT

## 2024-01-29 PROCEDURE — G0378 HOSPITAL OBSERVATION PER HR: HCPCS

## 2024-01-29 PROCEDURE — 99222 1ST HOSP IP/OBS MODERATE 55: CPT | Performed by: PSYCHIATRY & NEUROLOGY

## 2024-01-29 PROCEDURE — 97161 PT EVAL LOW COMPLEX 20 MIN: CPT

## 2024-01-29 PROCEDURE — 6370000000 HC RX 637 (ALT 250 FOR IP)

## 2024-01-29 PROCEDURE — 83036 HEMOGLOBIN GLYCOSYLATED A1C: CPT

## 2024-01-29 PROCEDURE — 36415 COLL VENOUS BLD VENIPUNCTURE: CPT

## 2024-01-29 PROCEDURE — 85025 COMPLETE CBC W/AUTO DIFF WBC: CPT

## 2024-01-29 PROCEDURE — 97165 OT EVAL LOW COMPLEX 30 MIN: CPT

## 2024-01-29 PROCEDURE — 97535 SELF CARE MNGMENT TRAINING: CPT

## 2024-01-29 PROCEDURE — 80048 BASIC METABOLIC PNL TOTAL CA: CPT

## 2024-01-29 PROCEDURE — 6370000000 HC RX 637 (ALT 250 FOR IP): Performed by: HOSPITALIST

## 2024-01-29 PROCEDURE — 97530 THERAPEUTIC ACTIVITIES: CPT

## 2024-01-29 RX ORDER — ACETAMINOPHEN 325 MG/1
650 TABLET ORAL EVERY 6 HOURS PRN
Status: DISCONTINUED | OUTPATIENT
Start: 2024-01-29 | End: 2024-01-29 | Stop reason: HOSPADM

## 2024-01-29 RX ORDER — ENOXAPARIN SODIUM 100 MG/ML
40 INJECTION SUBCUTANEOUS DAILY
Status: DISCONTINUED | OUTPATIENT
Start: 2024-01-29 | End: 2024-01-29 | Stop reason: HOSPADM

## 2024-01-29 RX ADMIN — ACETAMINOPHEN 650 MG: 325 TABLET ORAL at 06:59

## 2024-01-29 RX ADMIN — ASPIRIN 81 MG: 81 TABLET, CHEWABLE ORAL at 08:48

## 2024-01-29 RX ADMIN — ACETAMINOPHEN 650 MG: 325 TABLET ORAL at 15:45

## 2024-01-29 NOTE — TELEPHONE ENCOUNTER
Pt needs a hospital follow up appointment  Provider: Any  Location: Any  In person or virtual: In person  When: 4-6 weeks  Diagnosis/reason for follow up: Left arm numbness and tingling, possible mononeuropathy.  May need NCS/EMG.  Additional information:

## 2024-01-29 NOTE — PLAN OF CARE
Problem: Occupational Therapy - Adult  Goal: By Discharge: Performs self-care activities at highest level of function for planned discharge setting.  See evaluation for individualized goals.  Description: FUNCTIONAL STATUS PRIOR TO ADMISSION:  Patient was ambulatory using no DME   , ADL Assistance: Independent,  ,  ,  ,  ,  , Homemaking Assistance: Independent, Ambulation Assistance: Independent, Transfer Assistance: Independent, Active : Yes     HOME SUPPORT: Patient lived alone with no local support had 9 year old child    Occupational Therapy Goals:  Initiated 1/29/2024  1.  Patient will perform self-feeding with Denison within 7 day(s).  2.  Patient will perform grooming with Denison within 7 day(s).  3.  Patient will perform bathing with Denison within 7 day(s).  4.  Patient will perform toilet transfers with Denison  within 7 day(s).  5.  Patient will perform all aspects of toileting with Denison within 7 day(s).  6.  Patient will participate in upper extremity therapeutic exercise/activities with Denison for 5 minutes within 7 day(s).    7.  Patient will utilize energy conservation techniques during functional activities with verbal cues within 7 day(s).   Outcome: Progressing   OCCUPATIONAL THERAPY EVALUATION    Patient: Yoly Rueda (28 y.o. female)  Date: 1/29/2024  Primary Diagnosis: Left-sided weakness [R53.1]  Left arm weakness [R29.898]         Precautions: Fall Risk                  ASSESSMENT :  The patient is limited by decreased functional mobility, independence in ADLs, strength, endurance. Per chart review pt was admitted for L arm weakness and numbness. MRI negative. Prior to admission pt was living alone with daughter in home, independent with ADLS/IADLS, and  reports no local assistance when discharging home.      Upon arrival pt was received semi-reclined in bed, agreeable to therapy, cleared by RN. Pt was Mod I for bed mobility with fair/good sitting

## 2024-01-29 NOTE — DISCHARGE SUMMARY
Home With: prescription for outpatient PT   OT  PT  HH  RN       Long term SNF/Inpatient Rehab    Independent/assisted living    Hospice    Other:       PATIENT CONDITION AT DISCHARGE:     Functional status    Poor     Deconditioned    X Independent      Cognition    X Lucid     Forgetful     Dementia      Catheters/lines (plus indication)    Lo     PICC     PEG    X None      Code status    X Full code     DNR      PHYSICAL EXAMINATION AT DISCHARGE:    General : alert x 3, awake, no acute distress  HEENT: PEERL, EOMI, moist mucus membrane  Neck: supple, no JVD, no meningeal signs  Chest: Clear to auscultation bilaterally   CVS: S1 S2 heard, Capillary refill less than 2 seconds  Abd: soft/non tender, non distended, BS physiological  Ext: no clubbing, no cyanosis, no edema, brisk 2+ DP pulses  Neuro/Psych: pleasant mood and affect, CN 2-12 grossly intact, sensory grossly within normal limit, strength 5/5 to right extremities, strength 4/5 to left extremities, slight drift noted LLE  Skin: warm     CHRONIC MEDICAL DIAGNOSES:  Principal Problem:    Left arm weakness  Active Problems:    Left-sided weakness  Resolved Problems:    * No resolved hospital problems. *        Greater than 31 minutes were spent with the patient on counseling and coordination of care    Signed:   NAM Whitaker NP  1/29/2024  4:01 PM

## 2024-01-29 NOTE — PLAN OF CARE
Problem: Discharge Planning  Goal: Discharge to home or other facility with appropriate resources  Outcome: Progressing  Flowsheets (Taken 1/29/2024 0200 by Mike Crawford, RN)  Discharge to home or other facility with appropriate resources: Identify barriers to discharge with patient and caregiver     Problem: Physical Therapy - Adult  Goal: By Discharge: Performs mobility at highest level of function for planned discharge setting.  See evaluation for individualized goals.  Description: FUNCTIONAL STATUS PRIOR TO ADMISSION: Patient was independent and active without use of DME.    HOME SUPPORT PRIOR TO ADMISSION: The patient lived with 9 year old daughter but did not require assistance.    Physical Therapy Goals  Initiated 1/29/2024  1.  Patient will move from supine to sit and sit to supine in bed with independence within 7 day(s).    2.  Patient will perform sit to stand with independence within 7 day(s).  3.  Patient will transfer from bed to chair and chair to bed with independence using the least restrictive device within 7 day(s).  4.  Patient will ambulate with independence for 300 feet with the least restrictive device within 7 day(s).   5.  Patient will ascend/descend 12 stairs with one handrail(s) with modified independence within 7 day(s).  6.  Patient will improve Swan Balance score by 7 points within 7 days.   1/29/2024 1053 by Bert Guerra, PT  Outcome: Progressing     Problem: Safety - Adult  Goal: Free from fall injury  Outcome: Progressing     Problem: Skin/Tissue Integrity  Goal: Absence of new skin breakdown  Description: 1.  Monitor for areas of redness and/or skin breakdown  2.  Assess vascular access sites hourly  3.  Every 4-6 hours minimum:  Change oxygen saturation probe site  4.  Every 4-6 hours:  If on nasal continuous positive airway pressure, respiratory therapy assess nares and determine need for appliance change or resting period.  Outcome: Progressing     Problem: Chronic

## 2024-01-29 NOTE — CONSULTS
Neurology Consult Note     NAME: Yoly Rueda   :  1995   MRN:  [id number]   DATE:  2024       HPI:  Pt is a 27yo RH female admitted 24 with left arm weakness and numbness.  NIHSS score 3. /83. CTH neg. CTA H/N no LVO, no significant stenosis.  CTP neg. MRI brain and MRV are normal.  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 While in [employer] had N/T in left forearm and hand, she thought her Apple watch was too tight and so she took it off. Then went to the grocery store and noticed things felt heavier in her left hand and she dropped something. Believes N/W worsened in the grocery store.  She denies any lower extremity sxs.  Was able to walk without difficulty.  Patient reports that the numbness has gone away but she still feels like her left arm is weak today.  She also noticed when she tries to push up in the bed with her left arm she gets a tingling weird feeling down her arm from her elbow. No neck pain, no arm pain, no hand pain.  When asked about headache, she does report having a headache after arriving in the hospital, but just regularly headache in the right parietal region, not like her typical migraine headaches which are behind her eyes associated with N/V/photophobia.  She does get migraine headaches 2-3 times a week, but has never had any stroke like symptoms or vision changes with her headaches.  She denies any history of HTN, HLD, DM, TOB, ALANA, and no history of CVA in the family.  She has an appointment tomorrow for an evaluation for possible diabetes though due to feeling lightheaded and jittery lately. HgbA1C 4.7, LDL 57.4. BP's have been unremarkable.       PMH:  MHA  Asthma      ROS:  Per HPI otherwise negative      MEDS:  Home:  Current

## 2024-01-29 NOTE — PROGRESS NOTES
[facility] Adult  Hospitalist Group                                                                                          Hospitalist Progress Note  Chantal NAM Gaspar NP  Answering service: [phone] OR [phone] from in house phone        Date of Service:  2024  NAME:  Yoly Rueda  :  1995  MRN:  [id number]       Admission Summary:   The patient is a 28-year-old female who had no chronic medical history except for mild asthma and migraines, presented to emergency room due to left arm weakness and numbness, which she noticed early this morning. She woke up at 09:00 a.m. She denies having any headache. She also noticed some left leg weakness. She had no fever or cough. In the emergency room, CT scan of the head was done, which was negative for any acute finding. CTA showed no large vessel occlusion or perfusion deficit. The patient is admitted for further evaluation.      Interval history / Subjective:   Patient seen and examined, sitting in bed. Reports ongoing left-sided weakness, but states that numbness present yesterday has resolved. She walked with PT/OT today and notes heaviness to left leg with ambulation. Denies swallowing difficulty, speech changes, or vision changes. She notes recently completing treatment for strep throat and bacterial vaginosis.    Echo completed, results pending.    Neurology to see for further recommendations.     Assessment & Plan:     Left-sided numbness and weakness  - head CT negative; head/neck CTA and brain perfusion CT without evidence of LVO, perfusion defect, hemodynamically significant ICA stenosis  - brain MRI normal  - head MRV without dural venous sinus thrombosis  - continue 81 mg aspirin daily  - lipid panel normal   - A1c 4.7  - Neurology consulted  - echo completed, results pending    Migraines   - stable    Asthma  - not in exacerbation  - PRN albuterol     Code status: Full  Prophylaxis: Lovenox  Care Plan discussed with: patient,

## 2024-01-29 NOTE — PROGRESS NOTES
I have read and explained discharge instructions to patient. Patient verbalizes understanding.     Bedside RN performed patient education and medication education. Discharge concerns initiated and discussed with patient, including clarification on \"who\" assists the patient at their home and instructions for when the home going patient should call their provider after discharge. Opportunity for questions and clarification was provided.      Patient receptive to education:Yes  Patient stated: acceptance  Barriers to Education: none  Diagnosis Education given:  Yes    Length of stay: 1  Expected Day of Discharge: 1/29/24  Ask if they have \"Help at Home\" & add to white board?  Yes    Education Day #: 1    Medication Education Given:  Yes  M in the box Medication name: none    Pt aware of HCAHPS survey: Yes          Stroke Education documented in Patient Education: Yes  Core Measures Documented in [facility]:  Risk Factors: Yes  Warning signs of stroke: Yes  When to Activate 911: Yes  Medication Education for Risk Factors: Yes  Smoking cessation if applicable: Yes  Written Education Given:  Yes    Discharge NIH Completed: Yes  Score: 2    BRAINS: Yes    Follow Up Appointment Made: No  Date/Time if applicable: NA

## 2024-01-29 NOTE — PLAN OF CARE
Problem: Physical Therapy - Adult  Goal: By Discharge: Performs mobility at highest level of function for planned discharge setting.  See evaluation for individualized goals.  Description: FUNCTIONAL STATUS PRIOR TO ADMISSION: Patient was independent and active without use of DME.    HOME SUPPORT PRIOR TO ADMISSION: The patient lived with 9 year old daughter but did not require assistance.    Physical Therapy Goals  Initiated 1/29/2024  1.  Patient will move from supine to sit and sit to supine in bed with independence within 7 day(s).    2.  Patient will perform sit to stand with independence within 7 day(s).  3.  Patient will transfer from bed to chair and chair to bed with independence using the least restrictive device within 7 day(s).  4.  Patient will ambulate with independence for 300 feet with the least restrictive device within 7 day(s).   5.  Patient will ascend/descend 12 stairs with one handrail(s) with modified independence within 7 day(s).  6.  Patient will improve Breen Balance score by 7 points within 7 days.   Outcome: Progressing       PHYSICAL THERAPY EVALUATION    Patient: Yoly Rueda (28 y.o. female)  Date: 1/29/2024  Primary Diagnosis: Left-sided weakness [R53.1]  Left arm weakness [R29.898]       Precautions: Restrictions/Precautions: Fall Risk                      ASSESSMENT :   DEFICITS/IMPAIRMENTS:   The patient is limited by decreased strength, activity tolerance, endurance, balance who would benefit from PT to address these impairments. Patient demonstrated safe transfers and gait- while ambulating patient did not have any losses of balance, able to ambulate backwards approx 10 feet.  Patient did comment that her left lower ext does feel like she needs to concentrate to have it respond normally with functional tasks.     Functional Outcome Measure:  The patient scored 50 on the breen balance outcome measure which is indicative of 41-56=Low fall risk.           PLAN :  Recommendations and

## 2024-01-29 NOTE — DISCHARGE INSTRUCTIONS
Discharge Instructions       PATIENT ID: Yoly Rueda  MRN: [id number]   YOB: 1995    DATE OF ADMISSION: 1/28/2024   DATE OF DISCHARGE: 1/29/2024    PRIMARY CARE PROVIDER: Pedro Mathew     ATTENDING PHYSICIAN: Dick Toney MD   DISCHARGING PROVIDER: NAM Whitaker NP    To contact this individual call [phone] and ask the  to page.   If unavailable ask to be transferred the Adult Hospitalist Department.    DISCHARGE DIAGNOSES left-sided weakness    CONSULTATIONS: Neurology    PROCEDURES/SURGERIES: * No surgery found *    PENDING TEST RESULTS:   At the time of discharge the following test results are still pending: echo results    Follow-up Information       Follow up With Specialties Details Why Contact Info    Pedro Mathew MD Infectious Diseases Follow up Please call to schedule follow-up in 1 week. [address] [facility] [address] [phone]      Laverne Reyes MD Neurology Follow up Office should be calling to schedule follow-up appointment. Please call them if you do not hear from them in 1-2 days. [address] [facility] [address]  [place] [address] [phone]              ADDITIONAL CARE RECOMMENDATIONS:   Schedule follow-up appointments as listed above.   If you experience worsened left sided weakness, speech difficulties, swallowing difficulties, severe headache, or vision changes, you need to be evaluated in emergency department.   You are being discharged with a prescription for outpatient physical therapy for ongoing balance/gait evaluation.    DIET: regular diet    ACTIVITY: activity as tolerated    WOUND CARE: none    EQUIPMENT needed: none      DISCHARGE MEDICATIONS:   See Medication Reconciliation Form    It is important that you take the medication exactly as they are prescribed.   Keep your medication in the bottles provided by the pharmacist and keep a list of the medication names, dosages, and times to be taken in

## 2024-01-30 ENCOUNTER — OFFICE VISIT (OUTPATIENT)
Age: 29
End: 2024-01-30
Payer: MEDICAID

## 2024-01-30 VITALS
HEIGHT: 59 IN | WEIGHT: 112.6 LBS | RESPIRATION RATE: 18 BRPM | HEART RATE: 86 BPM | OXYGEN SATURATION: 100 % | SYSTOLIC BLOOD PRESSURE: 96 MMHG | DIASTOLIC BLOOD PRESSURE: 63 MMHG | TEMPERATURE: 98.2 F | BODY MASS INDEX: 22.7 KG/M2

## 2024-01-30 DIAGNOSIS — G43.C0 PERIODIC HEADACHE SYNDROME, NOT INTRACTABLE: ICD-10-CM

## 2024-01-30 DIAGNOSIS — R29.898 LEFT ARM WEAKNESS: Primary | ICD-10-CM

## 2024-01-30 DIAGNOSIS — R51.9 RECURRENT HEADACHE: ICD-10-CM

## 2024-01-30 DIAGNOSIS — J30.89 NON-SEASONAL ALLERGIC RHINITIS DUE TO OTHER ALLERGIC TRIGGER: ICD-10-CM

## 2024-01-30 PROCEDURE — 99214 OFFICE O/P EST MOD 30 MIN: CPT | Performed by: INTERNAL MEDICINE

## 2024-01-30 RX ORDER — CIPROFLOXACIN 500 MG/1
TABLET, FILM COATED ORAL
COMMUNITY
Start: 2024-01-24

## 2024-01-30 RX ORDER — RIZATRIPTAN BENZOATE 10 MG/1
10 TABLET ORAL
Qty: 10 TABLET | Refills: 2 | Status: SHIPPED | OUTPATIENT
Start: 2024-01-30 | End: 2024-01-30

## 2024-01-30 RX ORDER — FLUTICASONE PROPIONATE 50 MCG
2 SPRAY, SUSPENSION (ML) NASAL DAILY
Qty: 48 G | Refills: 1 | Status: SHIPPED | OUTPATIENT
Start: 2024-01-30

## 2024-01-30 RX ORDER — AZELASTINE 1 MG/ML
2 SPRAY, METERED NASAL 2 TIMES DAILY
Qty: 120 ML | Refills: 1 | Status: SHIPPED | OUTPATIENT
Start: 2024-01-30

## 2024-01-30 RX ORDER — ONDANSETRON 4 MG/1
4 TABLET, FILM COATED ORAL EVERY 8 HOURS PRN
Qty: 15 TABLET | Refills: 2 | Status: SHIPPED | OUTPATIENT
Start: 2024-01-30

## 2024-01-30 RX ORDER — FEXOFENADINE HCL 180 MG/1
180 TABLET ORAL DAILY
Qty: 90 TABLET | Refills: 3 | Status: SHIPPED | OUTPATIENT
Start: 2024-01-30 | End: 2024-04-29

## 2024-01-30 RX ORDER — BUTALBITAL, ACETAMINOPHEN AND CAFFEINE 50; 325; 40 MG/1; MG/1; MG/1
1 TABLET ORAL EVERY 6 HOURS PRN
Qty: 180 TABLET | Refills: 3 | Status: SHIPPED | OUTPATIENT
Start: 2024-01-30

## 2024-01-30 RX ORDER — IBUPROFEN 600 MG/1
600 TABLET ORAL EVERY 8 HOURS PRN
Qty: 50 TABLET | Refills: 2 | Status: SHIPPED | OUTPATIENT
Start: 2024-01-30

## 2024-01-30 SDOH — ECONOMIC STABILITY: FOOD INSECURITY: WITHIN THE PAST 12 MONTHS, YOU WORRIED THAT YOUR FOOD WOULD RUN OUT BEFORE YOU GOT MONEY TO BUY MORE.: NEVER TRUE

## 2024-01-30 SDOH — ECONOMIC STABILITY: INCOME INSECURITY: HOW HARD IS IT FOR YOU TO PAY FOR THE VERY BASICS LIKE FOOD, HOUSING, MEDICAL CARE, AND HEATING?: SOMEWHAT HARD

## 2024-01-30 SDOH — ECONOMIC STABILITY: TRANSPORTATION INSECURITY
IN THE PAST 12 MONTHS, HAS LACK OF TRANSPORTATION KEPT YOU FROM MEETINGS, WORK, OR FROM GETTING THINGS NEEDED FOR DAILY LIVING?: NO

## 2024-01-30 SDOH — ECONOMIC STABILITY: FOOD INSECURITY: WITHIN THE PAST 12 MONTHS, THE FOOD YOU BOUGHT JUST DIDN'T LAST AND YOU DIDN'T HAVE MONEY TO GET MORE.: SOMETIMES TRUE

## 2024-01-30 SDOH — ECONOMIC STABILITY: HOUSING INSECURITY
IN THE LAST 12 MONTHS, WAS THERE A TIME WHEN YOU DID NOT HAVE A STEADY PLACE TO SLEEP OR SLEPT IN A SHELTER (INCLUDING NOW)?: NO

## 2024-01-30 ASSESSMENT — PATIENT HEALTH QUESTIONNAIRE - PHQ9
SUM OF ALL RESPONSES TO PHQ QUESTIONS 1-9: 0
1. LITTLE INTEREST OR PLEASURE IN DOING THINGS: 0
SUM OF ALL RESPONSES TO PHQ9 QUESTIONS 1 & 2: 0
SUM OF ALL RESPONSES TO PHQ QUESTIONS 1-9: 0
2. FEELING DOWN, DEPRESSED OR HOPELESS: 0

## 2024-01-30 NOTE — PROGRESS NOTES
Rm: 17    Chief Complaint   Patient presents with    Diabetes     Pt thinks that she might be prediabetes and wants to get lab work done to see if she is and she states that brien is not working and she if she can use something elese she has really bad allergies         1. Have you been to the ER, urgent care clinic since your last visit?  Hospitalized since your last visit?yes, [facility]    2. Have you seen or consulted any other health care providers outside of the [facility] since your last visit?  Include any pap smears or colon screening. no        Social Determinants of Health     Tobacco Use: Low Risk  (1/30/2024)    Patient History     Smoking Tobacco Use: Never     Smokeless Tobacco Use: Never     Passive Exposure: Not on file   Alcohol Use: Not on file   Financial Resource Strain: Medium Risk (1/30/2024)    Overall Financial Resource Strain (CARDIA)     Difficulty of Paying Living Expenses: Somewhat hard   Food Insecurity: Food Insecurity Present (1/30/2024)    Hunger Vital Sign     Worried About Running Out of Food in the Last Year: Never true     Ran Out of Food in the Last Year: Sometimes true   Transportation Needs: Unknown (1/30/2024)    PRAPARE - Transportation     Lack of Transportation (Medical): Not on file     Lack of Transportation (Non-Medical): No   Physical Activity: Not on file   Stress: Not on file   Social Connections: Not on file   Intimate Partner Violence: Not on file   Depression: Not at risk (1/30/2024)    PHQ-2     PHQ-2 Score: 0   Housing Stability: Unknown (1/30/2024)    Housing Stability Vital Sign     Unable to Pay for Housing in the Last Year: Not on file     Number of Places Lived in the Last Year: Not on file     Unstable Housing in the Last Year: No   Interpersonal Safety: Not on file   Utilities: Not on file      
mass.      Tenderness: There is no abdominal tenderness.   Musculoskeletal:         General: No swelling, tenderness, deformity or signs of injury.      Right lower leg: No edema.      Left lower leg: No edema.   Skin:     Findings: No lesion or rash.   Neurological:      Mental Status: She is alert.         An electronic signature was used to authenticate this note.  -- Pedro Mathew MD

## 2024-02-07 NOTE — TELEPHONE ENCOUNTER
Last appointment: 01/30/2024 MD Mathew   Next appointment: Nothing scheduled   Previous refill encounter(s):   10/21/2000 Albuterol HFA INH #1 with 2 refills.     For Pharmacy Admin Tracking Only    Program: Medication Refill  Intervention Detail: New Rx: 1, reason: Patient Preference  Time Spent (min): 5    Requested Prescriptions     Pending Prescriptions Disp Refills    albuterol sulfate HFA (PROVENTIL;VENTOLIN;PROAIR) 108 (90 Base) MCG/ACT inhaler 18 g 0     Sig: Inhale 2 puffs into the lungs every 4 hours as needed for Wheezing     ----- Message from Alyson Espinoza sent at 2/6/2024  4:32 PM EST -----  Subject: Refill Request    QUESTIONS  Name of Medication? albuterol sulfate HFA (PROVENTIL;VENTOLIN;PROAIR) 108   (90 Base) MCG/ACT inhaler  Patient-reported dosage and instructions? Inhale 2 puffs into the lungs   every 4 hours as needed  How many days do you have left? 0  Preferred Pharmacy? [id number]  Pharmacy phone number (if available)? [phone]  Additional Information for Provider? Pt has been diagnosed with the flu.  ---------------------------------------------------------------------------  --------------  CALL BACK INFO  What is the best way for the office to contact you? OK to leave message on   voicemail  Preferred Call Back Phone Number? [phone]  ---------------------------------------------------------------------------  --------------  SCRIPT ANSWERS  Relationship to Patient? Self

## 2024-02-11 RX ORDER — ALBUTEROL SULFATE 90 UG/1
2 AEROSOL, METERED RESPIRATORY (INHALATION) EVERY 4 HOURS PRN
Qty: 8 G | Refills: 2 | Status: SHIPPED | OUTPATIENT
Start: 2024-02-11

## 2024-02-14 NOTE — TELEPHONE ENCOUNTER
Called patient, she did not  so I left a message to let her know where and why I was calling.  I also left our number for patient to give us a call back.

## 2024-04-12 ENCOUNTER — TELEPHONE (OUTPATIENT)
Age: 29
End: 2024-04-12

## 2024-04-15 ENCOUNTER — TELEPHONE (OUTPATIENT)
Age: 29
End: 2024-04-15

## 2024-04-15 NOTE — TELEPHONE ENCOUNTER
Spoke with mom and she states she needs a letter stating that she has asthma, in order for her to get her AC unit fixed. Please upload the letter to my chart.

## 2024-04-24 ENCOUNTER — TELEPHONE (OUTPATIENT)
Age: 29
End: 2024-04-24

## 2024-11-29 ENCOUNTER — HOSPITAL ENCOUNTER (EMERGENCY)
Facility: HOSPITAL | Age: 29
Discharge: HOME | End: 2024-11-30
Attending: EMERGENCY MEDICINE
Payer: MEDICAID

## 2024-11-29 ENCOUNTER — APPOINTMENT (OUTPATIENT)
Facility: HOSPITAL | Age: 29
End: 2024-11-29
Payer: MEDICAID

## 2024-11-29 VITALS
HEIGHT: 59 IN | TEMPERATURE: 98.3 F | OXYGEN SATURATION: 100 % | SYSTOLIC BLOOD PRESSURE: 107 MMHG | HEART RATE: 78 BPM | RESPIRATION RATE: 16 BRPM | DIASTOLIC BLOOD PRESSURE: 71 MMHG | BODY MASS INDEX: 22.53 KG/M2 | WEIGHT: 111.77 LBS

## 2024-11-29 DIAGNOSIS — R07.9 CHEST PAIN, UNSPECIFIED TYPE: Primary | ICD-10-CM

## 2024-11-29 LAB
ALBUMIN SERPL-MCNC: 3.8 G/DL (ref 3.5–5)
ALBUMIN/GLOB SERPL: 1.2 (ref 1.1–2.2)
ALP SERPL-CCNC: 55 U/L (ref 45–117)
ALT SERPL-CCNC: 18 U/L (ref 12–78)
ANION GAP SERPL CALC-SCNC: 2 MMOL/L (ref 2–12)
AST SERPL-CCNC: 15 U/L (ref 15–37)
BILIRUB SERPL-MCNC: 0.4 MG/DL (ref 0.2–1)
BUN SERPL-MCNC: 14 MG/DL (ref 6–20)
BUN/CREAT SERPL: 18 (ref 12–20)
CALCIUM SERPL-MCNC: 8.3 MG/DL (ref 8.5–10.1)
CHLORIDE SERPL-SCNC: 105 MMOL/L (ref 97–108)
CO2 SERPL-SCNC: 32 MMOL/L (ref 21–32)
COMMENT:: NORMAL
CREAT SERPL-MCNC: 0.77 MG/DL (ref 0.55–1.02)
GLOBULIN SER CALC-MCNC: 3.1 G/DL (ref 2–4)
GLUCOSE SERPL-MCNC: 120 MG/DL (ref 65–100)
POTASSIUM SERPL-SCNC: 3.6 MMOL/L (ref 3.5–5.1)
PROT SERPL-MCNC: 6.9 G/DL (ref 6.4–8.2)
SODIUM SERPL-SCNC: 139 MMOL/L (ref 136–145)
SPECIMEN HOLD: NORMAL
TROPONIN I SERPL HS-MCNC: 4 NG/L (ref 0–51)

## 2024-11-29 PROCEDURE — 84484 ASSAY OF TROPONIN QUANT: CPT

## 2024-11-29 PROCEDURE — 71046 X-RAY EXAM CHEST 2 VIEWS: CPT

## 2024-11-29 PROCEDURE — 80053 COMPREHEN METABOLIC PANEL: CPT

## 2024-11-29 PROCEDURE — 85025 COMPLETE CBC W/AUTO DIFF WBC: CPT

## 2024-11-29 PROCEDURE — 99285 EMERGENCY DEPT VISIT HI MDM: CPT

## 2024-11-29 PROCEDURE — 36415 COLL VENOUS BLD VENIPUNCTURE: CPT

## 2024-11-29 ASSESSMENT — PAIN DESCRIPTION - LOCATION: LOCATION: CHEST

## 2024-11-29 ASSESSMENT — PAIN - FUNCTIONAL ASSESSMENT
PAIN_FUNCTIONAL_ASSESSMENT: PREVENTS OR INTERFERES SOME ACTIVE ACTIVITIES AND ADLS
PAIN_FUNCTIONAL_ASSESSMENT: 0-10

## 2024-11-29 ASSESSMENT — PAIN SCALES - GENERAL: PAINLEVEL_OUTOF10: 8

## 2024-11-29 ASSESSMENT — PAIN DESCRIPTION - DESCRIPTORS: DESCRIPTORS: ACHING

## 2024-11-29 ASSESSMENT — PAIN DESCRIPTION - ORIENTATION: ORIENTATION: MID

## 2024-11-30 LAB
BASOPHILS # BLD: 0 K/UL (ref 0–0.1)
BASOPHILS NFR BLD: 0 % (ref 0–1)
DIFFERENTIAL METHOD BLD: ABNORMAL
EOSINOPHIL # BLD: 0.2 K/UL (ref 0–0.4)
EOSINOPHIL NFR BLD: 3 % (ref 0–7)
ERYTHROCYTE [DISTWIDTH] IN BLOOD BY AUTOMATED COUNT: 12.3 % (ref 11.5–14.5)
HCT VFR BLD AUTO: 35.8 % (ref 35–47)
HGB BLD-MCNC: 11.9 G/DL (ref 11.5–16)
IMM GRANULOCYTES # BLD AUTO: 0 K/UL (ref 0–0.04)
IMM GRANULOCYTES NFR BLD AUTO: 0 % (ref 0–0.5)
LYMPHOCYTES # BLD: 2.5 K/UL (ref 0.8–3.5)
LYMPHOCYTES NFR BLD: 32 % (ref 12–49)
MCH RBC QN AUTO: 31.4 PG (ref 26–34)
MCHC RBC AUTO-ENTMCNC: 33.2 G/DL (ref 30–36.5)
MCV RBC AUTO: 94.5 FL (ref 80–99)
MONOCYTES # BLD: 0.5 K/UL (ref 0–1)
MONOCYTES NFR BLD: 7 % (ref 5–13)
NEUTS SEG # BLD: 4.4 K/UL (ref 1.8–8)
NEUTS SEG NFR BLD: 58 % (ref 32–75)
NRBC # BLD: 0 K/UL (ref 0–0.01)
NRBC BLD-RTO: 0 PER 100 WBC
PLATELET # BLD AUTO: 234 K/UL (ref 150–400)
PMV BLD AUTO: 9.8 FL (ref 8.9–12.9)
RBC # BLD AUTO: 3.79 M/UL (ref 3.8–5.2)
WBC # BLD AUTO: 7.6 K/UL (ref 3.6–11)

## 2024-11-30 PROCEDURE — 6360000002 HC RX W HCPCS: Performed by: EMERGENCY MEDICINE

## 2024-11-30 PROCEDURE — 6370000000 HC RX 637 (ALT 250 FOR IP): Performed by: EMERGENCY MEDICINE

## 2024-11-30 PROCEDURE — 96374 THER/PROPH/DIAG INJ IV PUSH: CPT

## 2024-11-30 RX ORDER — KETOROLAC TROMETHAMINE 30 MG/ML
15 INJECTION, SOLUTION INTRAMUSCULAR; INTRAVENOUS ONCE
Status: COMPLETED | OUTPATIENT
Start: 2024-11-30 | End: 2024-11-30

## 2024-11-30 RX ORDER — FAMOTIDINE 20 MG/1
20 TABLET, FILM COATED ORAL 2 TIMES DAILY
Qty: 60 TABLET | Refills: 0 | Status: SHIPPED | OUTPATIENT
Start: 2024-11-30

## 2024-11-30 RX ADMIN — KETOROLAC TROMETHAMINE 15 MG: 30 INJECTION, SOLUTION INTRAMUSCULAR at 00:18

## 2024-11-30 RX ADMIN — ALUMINUM HYDROXIDE, MAGNESIUM HYDROXIDE, AND SIMETHICONE 40 ML: 1200; 120; 1200 SUSPENSION ORAL at 00:38

## 2024-11-30 ASSESSMENT — PAIN DESCRIPTION - ORIENTATION: ORIENTATION: MID

## 2024-11-30 ASSESSMENT — PAIN SCALES - GENERAL: PAINLEVEL_OUTOF10: 8

## 2024-11-30 ASSESSMENT — PAIN DESCRIPTION - LOCATION: LOCATION: BACK;CHEST

## 2024-11-30 ASSESSMENT — PAIN DESCRIPTION - DESCRIPTORS: DESCRIPTORS: SHARP

## 2024-11-30 NOTE — DISCHARGE INSTRUCTIONS
Your EKG and blood work showed you did not have a heart attack. Your Chest xray did not show a pneumonia. Please take pepcid for possible heartburn. See cardiology for further testing. Thank you.

## 2024-11-30 NOTE — ED NOTES
Patient was discharged by Dr. Herrera with the understanding to  [facility]iation [facility] and to follow up with cardiologist.

## 2024-11-30 NOTE — ED PROVIDER NOTES
General: No deformity.      Cervical back: Neck supple. No rigidity.   Skin:     Coloration: Skin is not jaundiced.   Neurological:      Mental Status: She is oriented to person, place, and time.   Psychiatric:         Behavior: Behavior normal.         DIAGNOSTIC RESULTS       RADIOLOGY:   Non-plain film images such as CT, Ultrasound and MRI are read by the radiologist. Plain radiographic images are visualized and preliminarily interpreted by the emergency physician with the below findings:    See ED course below.     Interpretation per the Radiologist below, if available at the time of this note:    XR CHEST (2 VW)   Final Result   No acute cardiopulmonary findings.               Electronically signed by ASAEL GLEZ           LABS:  Labs Reviewed   CBC WITH AUTO DIFFERENTIAL - Abnormal; Notable for the following components:       Result Value    RBC 3.79 (*)     All other components within normal limits   COMPREHENSIVE METABOLIC PANEL - Abnormal; Notable for the following components:    Glucose 120 (*)     Calcium 8.3 (*)     All other components within normal limits   EXTRA TUBES HOLD   TROPONIN       All other labs were within normal range or not returned as of this dictation.    EMERGENCY DEPARTMENT COURSE and DIFFERENTIAL DIAGNOSIS/MDM:   Medical Decision Making  Ddx includes PUD, gastritis, GERD, ACS, PNA    Work up negative for ACS, troponin negative after 3 hours of pain. GERD vs ?MSK. Will dc on trial of pepcid and provided referral to cardiology outpatient FU. Pt agreeable with plan, remains HD stable, non toxic appearing during ED stay.     Amount and/or Complexity of Data Reviewed  Labs: ordered. Decision-making details documented in ED Course.  Radiology: ordered and independent interpretation performed. Decision-making details documented in ED Course.  ECG/medicine tests: ordered.    Risk  Prescription drug management.        EKG: All EKG's are interpreted by the Emergency Department Physician who  either signs or Co-signs this chart in the absence of a cardiologist.    ED Course as of 11/30/24 0035   Fri Nov 29, 2024   3834 ED EKG interpretation:  Rhythm: normal sinus rhythm; and regular . Rate (approx.): 87; Axis: normal; ST/T wave: normal; No STEMI.   [AL]   1506 I have independently viewed the obtained radiographic images and note cxr without acute findings. Will await radiology read.    [AL]      ED Course User Index  [AL] Rosalia Herrera MD       CRITICAL CARE TIME   Total Critical Care time was 0 minutes, excluding separately reportable procedures. There was a high probability of clinically significant/life threatening deterioration in the patient's condition with required my urgent intervention.     CONSULTS:  None    PROCEDURES:  Unless otherwise noted below, none     Procedures    FINAL IMPRESSION      1. Chest pain, unspecified type          DISPOSITION/PLAN   DISPOSITION Decision To Discharge 11/30/2024 12:34:17 AM    PATIENT REFERRED TO:  [facility] EMERGENCY DEP  [address] [facility] [address] [id number] [phone]    As needed    Manish Lozoya MD  [address]  [facility] [id number] [phone]    Schedule an appointment as soon as possible for a visit in 1 week  for symptom recheck      DISCHARGE MEDICATIONS:  New Prescriptions    FAMOTIDINE (PEPCID) 20 MG TABLET    Take 1 tablet by mouth 2 times daily     Patient's results have been reviewed with them.  Patient and/or family have verbally conveyed their understanding and agreement of the patient's signs, symptoms, diagnosis, treatment and prognosis and additionally agree to follow up as recommended or return to the Emergency Room should their condition change prior to follow-up.  Discharge instructions have also been provided to the patient with some educational information regarding their diagnosis as well a list of reasons why they would want to return to the ER prior to their follow-up appointment should their condition

## 2024-11-30 NOTE — ED NOTES
Bedside shift change report given to Faith RN (oncoming nurse) by Ruddy RN (offgoing nurse). Report included the following information ED Encounter Summary, ED SBAR, MAR, and Recent Results.

## 2024-11-30 NOTE — ED TRIAGE NOTES
Patient arrives to ED reports chest pain starting at 5pm today, reports worsening pain upon movement. Patient reports pain radiates into back

## 2024-12-17 ENCOUNTER — HOSPITAL ENCOUNTER (EMERGENCY)
Facility: HOSPITAL | Age: 29
Discharge: HOME | End: 2024-12-17
Attending: EMERGENCY MEDICINE
Payer: COMMERCIAL

## 2024-12-17 ENCOUNTER — APPOINTMENT (OUTPATIENT)
Facility: HOSPITAL | Age: 29
End: 2024-12-17
Payer: COMMERCIAL

## 2024-12-17 VITALS
WEIGHT: 110.23 LBS | HEART RATE: 81 BPM | OXYGEN SATURATION: 99 % | BODY MASS INDEX: 22.26 KG/M2 | DIASTOLIC BLOOD PRESSURE: 71 MMHG | RESPIRATION RATE: 16 BRPM | SYSTOLIC BLOOD PRESSURE: 109 MMHG

## 2024-12-17 DIAGNOSIS — G43.911 INTRACTABLE MIGRAINE WITH STATUS MIGRAINOSUS, UNSPECIFIED MIGRAINE TYPE: Primary | ICD-10-CM

## 2024-12-17 PROCEDURE — 6360000002 HC RX W HCPCS: Performed by: EMERGENCY MEDICINE

## 2024-12-17 PROCEDURE — 96365 THER/PROPH/DIAG IV INF INIT: CPT

## 2024-12-17 PROCEDURE — 70450 CT HEAD/BRAIN W/O DYE: CPT

## 2024-12-17 PROCEDURE — 96375 TX/PRO/DX INJ NEW DRUG ADDON: CPT

## 2024-12-17 PROCEDURE — 99284 EMERGENCY DEPT VISIT MOD MDM: CPT

## 2024-12-17 RX ORDER — MAGNESIUM SULFATE IN WATER 40 MG/ML
2000 INJECTION, SOLUTION INTRAVENOUS ONCE
Status: COMPLETED | OUTPATIENT
Start: 2024-12-17 | End: 2024-12-17

## 2024-12-17 RX ORDER — KETOROLAC TROMETHAMINE 30 MG/ML
30 INJECTION, SOLUTION INTRAMUSCULAR; INTRAVENOUS
Status: COMPLETED | OUTPATIENT
Start: 2024-12-17 | End: 2024-12-17

## 2024-12-17 RX ORDER — DIPHENHYDRAMINE HYDROCHLORIDE 50 MG/ML
50 INJECTION INTRAMUSCULAR; INTRAVENOUS ONCE
Status: COMPLETED | OUTPATIENT
Start: 2024-12-17 | End: 2024-12-17

## 2024-12-17 RX ORDER — DEXAMETHASONE SODIUM PHOSPHATE 10 MG/ML
10 INJECTION, SOLUTION INTRAMUSCULAR; INTRAVENOUS ONCE
Status: COMPLETED | OUTPATIENT
Start: 2024-12-17 | End: 2024-12-17

## 2024-12-17 RX ORDER — PROCHLORPERAZINE EDISYLATE 5 MG/ML
10 INJECTION INTRAMUSCULAR; INTRAVENOUS ONCE
Status: COMPLETED | OUTPATIENT
Start: 2024-12-17 | End: 2024-12-17

## 2024-12-17 RX ADMIN — MAGNESIUM SULFATE IN WATER 2000 MG: 40 INJECTION, SOLUTION INTRAVENOUS at 12:19

## 2024-12-17 RX ADMIN — KETOROLAC TROMETHAMINE 30 MG: 30 INJECTION, SOLUTION INTRAMUSCULAR at 11:55

## 2024-12-17 RX ADMIN — PROCHLORPERAZINE EDISYLATE 10 MG: 5 INJECTION INTRAMUSCULAR; INTRAVENOUS at 11:58

## 2024-12-17 RX ADMIN — DEXAMETHASONE SODIUM PHOSPHATE 10 MG: 10 INJECTION INTRAMUSCULAR; INTRAVENOUS at 11:58

## 2024-12-17 RX ADMIN — DIPHENHYDRAMINE HYDROCHLORIDE 50 MG: 50 INJECTION INTRAMUSCULAR; INTRAVENOUS at 11:57

## 2024-12-17 ASSESSMENT — PAIN SCALES - GENERAL: PAINLEVEL_OUTOF10: 8

## 2024-12-17 ASSESSMENT — PAIN DESCRIPTION - LOCATION: LOCATION: HEAD

## 2024-12-17 ASSESSMENT — PAIN DESCRIPTION - DESCRIPTORS: DESCRIPTORS: ACHING

## 2024-12-17 ASSESSMENT — PAIN DESCRIPTION - ORIENTATION: ORIENTATION: OTHER (COMMENT)

## 2024-12-17 NOTE — ED PROVIDER NOTES
acute distress.     Appearance: Normal appearance. She is not ill-appearing, toxic-appearing or diaphoretic.   HENT:      Head: Normocephalic and atraumatic.      Mouth/Throat:      Mouth: Mucous membranes are moist.   Eyes:      Extraocular Movements: Extraocular movements intact.   Cardiovascular:      Rate and Rhythm: Normal rate.   Pulmonary:      Effort: Pulmonary effort is normal.   Abdominal:      General: There is no distension.   Musculoskeletal:         General: Normal range of motion.      Cervical back: Normal range of motion.   Skin:     General: Skin is dry.   Neurological:      General: No focal deficit present.      Mental Status: She is alert.   Psychiatric:         Mood and Affect: Mood normal.         DIAGNOSTIC RESULTS     RADIOLOGY:   Non-plain film images such as CT, Ultrasound and MRI are read by the radiologist. Plain radiographic images are visualized and preliminarily interpreted by the emergency physician with the below findings:        Interpretation per the Radiologist below, if available at the time of this note:    CT HEAD WO CONTRAST   Final Result   No acute intracranial hemorrhage, mass or infarct.          Electronically signed by Blake Wilkerson MD            ED BEDSIDE ULTRASOUND:   Performed by ED Physician    LABS:  Labs Reviewed - No data to display    All other labs were unremarkable or not returned as of this dictation.    EMERGENCY DEPARTMENT COURSE and DIFFERENTIAL DIAGNOSIS/MDM:   Medical Decision Making  29-year-old female presents with a chief complaint of a migraine headache.  Differential include but not limited to migraine, less likely intracranial hemorrhage or mass.  CT head is negative.  Patient treated with migraine cocktail including Decadron, Toradol, Benadryl, Compazine, magnesium.  She reports improvement in symptoms.  Discussed my clinical impression(s), any labs and/or radiology results with the patient. I answered any questions and addressed any concerns.

## 2024-12-17 NOTE — ED NOTES
Patient left ED in no acute distress, alert and oriented x4. Patient was encouraged to come back if symptoms get worse. Patient was provided with discharge instructions and prescriptions. All questions were answered. Patient left ambulatory.    Patient's IV removed by this nurse.

## 2025-02-02 ENCOUNTER — APPOINTMENT (OUTPATIENT)
Facility: HOSPITAL | Age: 30
End: 2025-02-02
Payer: MEDICAID

## 2025-02-02 ENCOUNTER — HOSPITAL ENCOUNTER (EMERGENCY)
Facility: HOSPITAL | Age: 30
Discharge: HOME | End: 2025-02-02
Attending: EMERGENCY MEDICINE
Payer: MEDICAID

## 2025-02-02 VITALS
BODY MASS INDEX: 22.09 KG/M2 | SYSTOLIC BLOOD PRESSURE: 90 MMHG | TEMPERATURE: 98.2 F | OXYGEN SATURATION: 100 % | HEIGHT: 59 IN | DIASTOLIC BLOOD PRESSURE: 63 MMHG | RESPIRATION RATE: 18 BRPM | HEART RATE: 78 BPM | WEIGHT: 109.57 LBS

## 2025-02-02 DIAGNOSIS — R11.2 NAUSEA AND VOMITING, UNSPECIFIED VOMITING TYPE: Primary | ICD-10-CM

## 2025-02-02 LAB
ALBUMIN SERPL-MCNC: 4 G/DL (ref 3.5–5)
ALBUMIN/GLOB SERPL: 1.1 (ref 1.1–2.2)
ALP SERPL-CCNC: 48 U/L (ref 45–117)
ALT SERPL-CCNC: 15 U/L (ref 12–78)
ANION GAP SERPL CALC-SCNC: 5 MMOL/L (ref 2–12)
APPEARANCE UR: CLEAR
AST SERPL-CCNC: 11 U/L (ref 15–37)
BACTERIA URNS QL MICRO: NEGATIVE /HPF
BASOPHILS # BLD: 0.02 K/UL (ref 0–0.1)
BASOPHILS NFR BLD: 0.2 % (ref 0–1)
BILIRUB SERPL-MCNC: 0.9 MG/DL (ref 0.2–1)
BILIRUB UR QL: NEGATIVE
BUN SERPL-MCNC: 20 MG/DL (ref 6–20)
BUN/CREAT SERPL: 27 (ref 12–20)
CALCIUM SERPL-MCNC: 9.1 MG/DL (ref 8.5–10.1)
CHLORIDE SERPL-SCNC: 104 MMOL/L (ref 97–108)
CO2 SERPL-SCNC: 29 MMOL/L (ref 21–32)
COLOR UR: ABNORMAL
COMMENT:: NORMAL
CREAT SERPL-MCNC: 0.74 MG/DL (ref 0.55–1.02)
DIFFERENTIAL METHOD BLD: ABNORMAL
EOSINOPHIL # BLD: 0.06 K/UL (ref 0–0.4)
EOSINOPHIL NFR BLD: 0.7 % (ref 0–7)
EPITH CASTS URNS QL MICRO: ABNORMAL /LPF
ERYTHROCYTE [DISTWIDTH] IN BLOOD BY AUTOMATED COUNT: 12.3 % (ref 11.5–14.5)
GLOBULIN SER CALC-MCNC: 3.6 G/DL (ref 2–4)
GLUCOSE SERPL-MCNC: 115 MG/DL (ref 65–100)
GLUCOSE UR STRIP.AUTO-MCNC: NEGATIVE MG/DL
HCG SERPL QL: NEGATIVE
HCT VFR BLD AUTO: 39.8 % (ref 35–47)
HGB BLD-MCNC: 13.2 G/DL (ref 11.5–16)
HGB UR QL STRIP: NEGATIVE
IMM GRANULOCYTES # BLD AUTO: 0.03 K/UL (ref 0–0.04)
IMM GRANULOCYTES NFR BLD AUTO: 0.4 % (ref 0–0.5)
KETONES UR QL STRIP.AUTO: 40 MG/DL
LEUKOCYTE ESTERASE UR QL STRIP.AUTO: NEGATIVE
LIPASE SERPL-CCNC: 50 U/L (ref 13–75)
LYMPHOCYTES # BLD: 0.71 K/UL (ref 0.8–3.5)
LYMPHOCYTES NFR BLD: 8.6 % (ref 12–49)
MCH RBC QN AUTO: 30.7 PG (ref 26–34)
MCHC RBC AUTO-ENTMCNC: 33.2 G/DL (ref 30–36.5)
MCV RBC AUTO: 92.6 FL (ref 80–99)
MONOCYTES # BLD: 0.28 K/UL (ref 0–1)
MONOCYTES NFR BLD: 3.4 % (ref 5–13)
MUCOUS THREADS URNS QL MICRO: ABNORMAL /LPF
NEUTS SEG # BLD: 7.2 K/UL (ref 1.8–8)
NEUTS SEG NFR BLD: 86.7 % (ref 32–75)
NITRITE UR QL STRIP.AUTO: NEGATIVE
NRBC # BLD: 0 K/UL (ref 0–0.01)
NRBC BLD-RTO: 0 PER 100 WBC
PH UR STRIP: 5.5 (ref 5–8)
PLATELET # BLD AUTO: 193 K/UL (ref 150–400)
PMV BLD AUTO: 9.5 FL (ref 8.9–12.9)
POTASSIUM SERPL-SCNC: 3.9 MMOL/L (ref 3.5–5.1)
PROT SERPL-MCNC: 7.6 G/DL (ref 6.4–8.2)
PROT UR STRIP-MCNC: ABNORMAL MG/DL
RBC # BLD AUTO: 4.3 M/UL (ref 3.8–5.2)
RBC #/AREA URNS HPF: ABNORMAL /HPF (ref 0–5)
RBC MORPH BLD: ABNORMAL
SODIUM SERPL-SCNC: 138 MMOL/L (ref 136–145)
SP GR UR REFRACTOMETRY: >1.03
SPECIMEN HOLD: NORMAL
URINE CULTURE IF INDICATED: ABNORMAL
UROBILINOGEN UR QL STRIP.AUTO: 0.2 EU/DL (ref 0.2–1)
WBC # BLD AUTO: 8.3 K/UL (ref 3.6–11)
WBC URNS QL MICRO: ABNORMAL /HPF (ref 0–4)

## 2025-02-02 PROCEDURE — 83690 ASSAY OF LIPASE: CPT

## 2025-02-02 PROCEDURE — 84703 CHORIONIC GONADOTROPIN ASSAY: CPT

## 2025-02-02 PROCEDURE — 85025 COMPLETE CBC W/AUTO DIFF WBC: CPT

## 2025-02-02 PROCEDURE — 6360000004 HC RX CONTRAST MEDICATION: Performed by: EMERGENCY MEDICINE

## 2025-02-02 PROCEDURE — 99285 EMERGENCY DEPT VISIT HI MDM: CPT

## 2025-02-02 PROCEDURE — 2580000003 HC RX 258: Performed by: EMERGENCY MEDICINE

## 2025-02-02 PROCEDURE — 96375 TX/PRO/DX INJ NEW DRUG ADDON: CPT

## 2025-02-02 PROCEDURE — 36415 COLL VENOUS BLD VENIPUNCTURE: CPT

## 2025-02-02 PROCEDURE — 6370000000 HC RX 637 (ALT 250 FOR IP): Performed by: EMERGENCY MEDICINE

## 2025-02-02 PROCEDURE — 6360000002 HC RX W HCPCS: Performed by: EMERGENCY MEDICINE

## 2025-02-02 PROCEDURE — 74177 CT ABD & PELVIS W/CONTRAST: CPT

## 2025-02-02 PROCEDURE — 80053 COMPREHEN METABOLIC PANEL: CPT

## 2025-02-02 PROCEDURE — 81001 URINALYSIS AUTO W/SCOPE: CPT

## 2025-02-02 PROCEDURE — 96374 THER/PROPH/DIAG INJ IV PUSH: CPT

## 2025-02-02 RX ORDER — 0.9 % SODIUM CHLORIDE 0.9 %
1000 INTRAVENOUS SOLUTION INTRAVENOUS ONCE
Status: COMPLETED | OUTPATIENT
Start: 2025-02-02 | End: 2025-02-02

## 2025-02-02 RX ORDER — DICYCLOMINE HYDROCHLORIDE 10 MG/1
10 CAPSULE ORAL
Qty: 20 CAPSULE | Refills: 0 | Status: SHIPPED | OUTPATIENT
Start: 2025-02-02 | End: 2025-02-07

## 2025-02-02 RX ORDER — KETOROLAC TROMETHAMINE 30 MG/ML
15 INJECTION, SOLUTION INTRAMUSCULAR; INTRAVENOUS ONCE
Status: COMPLETED | OUTPATIENT
Start: 2025-02-02 | End: 2025-02-02

## 2025-02-02 RX ORDER — ONDANSETRON 4 MG/1
4 TABLET, ORALLY DISINTEGRATING ORAL 3 TIMES DAILY PRN
Qty: 15 TABLET | Refills: 0 | Status: SHIPPED | OUTPATIENT
Start: 2025-02-02

## 2025-02-02 RX ORDER — IOPAMIDOL 755 MG/ML
100 INJECTION, SOLUTION INTRAVASCULAR
Status: COMPLETED | OUTPATIENT
Start: 2025-02-02 | End: 2025-02-02

## 2025-02-02 RX ORDER — ONDANSETRON 2 MG/ML
4 INJECTION INTRAMUSCULAR; INTRAVENOUS ONCE
Status: COMPLETED | OUTPATIENT
Start: 2025-02-02 | End: 2025-02-02

## 2025-02-02 RX ORDER — DICYCLOMINE HYDROCHLORIDE 10 MG/1
20 CAPSULE ORAL ONCE
Status: COMPLETED | OUTPATIENT
Start: 2025-02-02 | End: 2025-02-02

## 2025-02-02 RX ADMIN — KETOROLAC TROMETHAMINE 15 MG: 30 INJECTION, SOLUTION INTRAMUSCULAR at 05:25

## 2025-02-02 RX ADMIN — IOPAMIDOL 100 ML: 755 INJECTION, SOLUTION INTRAVENOUS at 08:02

## 2025-02-02 RX ADMIN — SODIUM CHLORIDE 1000 ML: 9 INJECTION, SOLUTION INTRAVENOUS at 06:58

## 2025-02-02 RX ADMIN — DICYCLOMINE HYDROCHLORIDE 20 MG: 10 CAPSULE ORAL at 06:58

## 2025-02-02 RX ADMIN — ONDANSETRON 4 MG: 2 INJECTION INTRAMUSCULAR; INTRAVENOUS at 05:25

## 2025-02-02 RX ADMIN — SODIUM CHLORIDE 1000 ML: 9 INJECTION, SOLUTION INTRAVENOUS at 05:23

## 2025-02-02 ASSESSMENT — PAIN - FUNCTIONAL ASSESSMENT
PAIN_FUNCTIONAL_ASSESSMENT: ACTIVITIES ARE NOT PREVENTED
PAIN_FUNCTIONAL_ASSESSMENT: 0-10

## 2025-02-02 ASSESSMENT — PAIN DESCRIPTION - PAIN TYPE: TYPE: ACUTE PAIN

## 2025-02-02 ASSESSMENT — PAIN DESCRIPTION - ONSET: ONSET: ON-GOING

## 2025-02-02 ASSESSMENT — PAIN DESCRIPTION - LOCATION
LOCATION: ABDOMEN
LOCATION: ABDOMEN

## 2025-02-02 ASSESSMENT — PAIN DESCRIPTION - ORIENTATION: ORIENTATION: UPPER;MID

## 2025-02-02 ASSESSMENT — PAIN DESCRIPTION - DESCRIPTORS: DESCRIPTORS: ACHING

## 2025-02-02 ASSESSMENT — PAIN DESCRIPTION - FREQUENCY: FREQUENCY: CONTINUOUS

## 2025-02-02 ASSESSMENT — PAIN SCALES - GENERAL
PAINLEVEL_OUTOF10: 8
PAINLEVEL_OUTOF10: 8

## 2025-02-02 NOTE — DISCHARGE INSTRUCTIONS
The CT scan of your abdomen did not show any acute abnormalities.  This is likely a viral illness.  Please use Zofran for nausea and stay hydrated.  You can also use Bentyl which is an antispasmodic as needed for abdominal pain.  Follow-up with your PCP for symptom recheck.  Thank you.

## 2025-02-02 NOTE — ED NOTES
Patient offered crackers for a PO challenge, however the patient doesn't want to eat anything at this time.

## 2025-02-02 NOTE — ED PROVIDER NOTES
[facility] EMERGENCY DEPARTMENT  EMERGENCY DEPARTMENT ENCOUNTER      Pt Name: Yoly Rueda  MRN: [id number]  Birthdate 1995  Date of evaluation: 2/2/2025  Provider: Rosalia Herrera MD    CHIEF COMPLAINT       Chief Complaint   Patient presents with    Abdominal Pain    Nausea    Vomiting         HISTORY OF PRESENT ILLNESS   (Location/Symptom, Timing/Onset, Context/Setting, Quality, Duration, Modifying Factors, Severity)  Note limiting factors.   Patient is a 29-year-old female history of asthma, and migraines who presents with nausea, vomiting that started yesterday.  Patient says that the abdominal pain started hours after multiple episodes of emesis.  Patient denies any urinary symptoms, no vaginal symptoms.  Patient also reports subjective fevers at home prior to arrival.        Nursing Notes were reviewed.    REVIEW OF SYSTEMS    Not Required     Review of Systems    PAST MEDICAL HISTORY     Past Medical History:   Diagnosis Date    Asthma     Migraine        SURGICAL HISTORY     No past surgical history on file.    CURRENT MEDICATIONS       Previous Medications    ALBUTEROL SULFATE HFA (PROVENTIL;VENTOLIN;PROAIR) 108 (90 BASE) MCG/ACT INHALER    Inhale 2 puffs into the lungs every 4 hours as needed for Wheezing    AZELASTINE (ASTELIN) 0.1 % NASAL SPRAY    2 sprays by Nasal route 2 times daily Use in each nostril as directed    BUTALBITAL-ACETAMINOPHEN-CAFFEINE (FIORICET, ESGIC) -40 MG PER TABLET    Take 1 tablet by mouth every 6 hours as needed for Headaches    CIPROFLOXACIN (CIPRO) 500 MG TABLET    Take 1 tablet every 12 hours by oral route for 7 days.    FAMOTIDINE (PEPCID) 20 MG TABLET    Take 1 tablet by mouth 2 times daily    FLUTICASONE (FLONASE) 50 MCG/ACT NASAL SPRAY    2 sprays by Each Nostril route daily    IBUPROFEN (ADVIL;MOTRIN) 600 MG TABLET    Take 1 tablet by mouth every 8 hours as needed for Pain (Headache)    ONDANSETRON (ZOFRAN) 4 MG TABLET    Take 1 tablet by mouth every 8

## 2025-02-02 NOTE — ED TRIAGE NOTES
Pt ambulatory to ER with cc of N/V/D and fever since yesterday. Pt also endorses upper abd pain. Denies taking any medications prior to arrival

## 2025-06-19 ENCOUNTER — OFFICE VISIT (OUTPATIENT)
Age: 30
End: 2025-06-19
Payer: MEDICAID

## 2025-06-19 VITALS
BODY MASS INDEX: 22.3 KG/M2 | TEMPERATURE: 98.5 F | HEART RATE: 101 BPM | WEIGHT: 110.6 LBS | HEIGHT: 59 IN | SYSTOLIC BLOOD PRESSURE: 102 MMHG | RESPIRATION RATE: 16 BRPM | DIASTOLIC BLOOD PRESSURE: 66 MMHG | OXYGEN SATURATION: 100 %

## 2025-06-19 DIAGNOSIS — R55 VASOVAGAL SYMPTOM: ICD-10-CM

## 2025-06-19 DIAGNOSIS — R25.1 TREMULOUSNESS: ICD-10-CM

## 2025-06-19 DIAGNOSIS — R55 VASOVAGAL SYMPTOM: Primary | ICD-10-CM

## 2025-06-19 PROCEDURE — 99214 OFFICE O/P EST MOD 30 MIN: CPT | Performed by: FAMILY MEDICINE

## 2025-06-19 SDOH — ECONOMIC STABILITY: FOOD INSECURITY: WITHIN THE PAST 12 MONTHS, YOU WORRIED THAT YOUR FOOD WOULD RUN OUT BEFORE YOU GOT MONEY TO BUY MORE.: NEVER TRUE

## 2025-06-19 SDOH — ECONOMIC STABILITY: FOOD INSECURITY: WITHIN THE PAST 12 MONTHS, THE FOOD YOU BOUGHT JUST DIDN'T LAST AND YOU DIDN'T HAVE MONEY TO GET MORE.: NEVER TRUE

## 2025-06-19 ASSESSMENT — PATIENT HEALTH QUESTIONNAIRE - PHQ9
1. LITTLE INTEREST OR PLEASURE IN DOING THINGS: NOT AT ALL
SUM OF ALL RESPONSES TO PHQ QUESTIONS 1-9: 0
2. FEELING DOWN, DEPRESSED OR HOPELESS: NOT AT ALL

## 2025-06-19 NOTE — PROGRESS NOTES
RM:2    Chief Complaint   Patient presents with    Other     Pt stated she gets jitter if she dosent eat. She stated she have to eat something sweet to bring her around       Fasting No    Vitals:    06/19/25 1413   BP: 103/68   BP Site: Right Upper Arm   Patient Position: Sitting   BP Cuff Size: Medium Adult   Pulse: (!) 101   Resp: 16   Temp: 98.5 °F (36.9 °C)   TempSrc: Oral   SpO2: 100%   Weight: 50.2 kg (110 lb 9.6 oz)   Height: 1.499 m (4' 11\")             No data to display                \"Have you been to the ER, urgent care clinic since your last visit?  Hospitalized since your last visit?\"    NO    “Have you seen or consulted any other health care providers outside of [facility] since your last visit?”    NO     “Have you had a pap smear?”    NO    No cervical cancer screening on file             Click Here for Release of Records Request   AVS  education, follow up, and recommendations provided and addressed with patient.  services used to advise patient no  
tablet by mouth every 6 hours as needed for Headaches, Disp: 180 tablet, Rfl: 3    rizatriptan (MAXALT) 10 MG tablet, Take 1 tablet by mouth once as needed for Migraine May repeat in 2 hours if needed, Disp: 10 tablet, Rfl: 2    ibuprofen (ADVIL;MOTRIN) 600 MG tablet, Take 1 tablet by mouth every 8 hours as needed for Pain (Headache), Disp: 50 tablet, Rfl: 2    dicyclomine (BENTYL) 10 MG capsule, Take 1 capsule by mouth 4 times daily (before meals and nightly) for 5 days, Disp: 20 capsule, Rfl: 0    ondansetron (ZOFRAN-ODT) 4 MG disintegrating tablet, Take 1 tablet by mouth 3 times daily as needed for Nausea or Vomiting (Patient not taking: Reported on 6/19/2025), Disp: 15 tablet, Rfl: 0    ciprofloxacin (CIPRO) 500 MG tablet, Take 1 tablet every 12 hours by oral route for 7 days. (Patient not taking: Reported on 6/19/2025), Disp: , Rfl:     azelastine (ASTELIN) 0.1 % nasal spray, 2 sprays by Nasal route 2 times daily Use in each nostril as directed (Patient not taking: Reported on 6/19/2025), Disp: 120 mL, Rfl: 1    ondansetron (ZOFRAN) 4 MG tablet, Take 1 tablet by mouth every 8 hours as needed for Nausea, Vomiting or Other (Headache) (Patient not taking: Reported on 6/19/2025), Disp: 15 tablet, Rfl: 2  Allergies   Allergen Reactions    Grass Pollen([id number]-Swt Kapil)      Other reaction(s): Sneezing    Molds & Smuts      Other reaction(s): Sneezing    Tree Extract      Other reaction(s): Sneezing            Objective   Vitals:    06/19/25 1413 06/19/25 1422 06/19/25 1425   BP: 103/68 102/70 102/66   BP Site: Right Upper Arm Left Upper Arm Left Upper Arm   Patient Position: Sitting Sitting Lying right side   BP Cuff Size: Medium Adult Small Adult Small Adult   Pulse: (!) 101     Resp: 16     Temp: 98.5 °F (36.9 °C)     TempSrc: Oral     SpO2: 100%     Weight: 50.2 kg (110 lb 9.6 oz)     Height: 1.499 m (4' 11\")        Physical Exam  Awake, alert, oriented x4, no focal deficit  Normocephalic, atraumatic  Pupils

## 2025-06-21 LAB
BUN SERPL-MCNC: 13 MG/DL (ref 6–20)
BUN/CREAT SERPL: 16 (ref 9–23)
C PEPTIDE SERPL-MCNC: 4.1 NG/ML (ref 1.1–4.4)
CALCIUM SERPL-MCNC: 9.1 MG/DL (ref 8.7–10.2)
CHLORIDE SERPL-SCNC: 101 MMOL/L (ref 96–106)
CO2 SERPL-SCNC: 24 MMOL/L (ref 20–29)
CREAT SERPL-MCNC: 0.83 MG/DL (ref 0.57–1)
ERYTHROCYTE [DISTWIDTH] IN BLOOD BY AUTOMATED COUNT: 11.9 % (ref 11.7–15.4)
GLUCOSE SERPL-MCNC: 78 MG/DL (ref 70–99)
HCT VFR BLD AUTO: 37.8 % (ref 34–46.6)
HGB BLD-MCNC: 12.3 G/DL (ref 11.1–15.9)
INSULIN SERPL-ACNC: 17.6 UIU/ML (ref 2.6–24.9)
MCH RBC QN AUTO: 31.1 PG (ref 26.6–33)
MCHC RBC AUTO-ENTMCNC: 32.5 G/DL (ref 31.5–35.7)
MCV RBC AUTO: 96 FL (ref 79–97)
PLATELET # BLD AUTO: 259 X10E3/UL (ref 150–450)
POTASSIUM SERPL-SCNC: 3.8 MMOL/L (ref 3.5–5.2)
RBC # BLD AUTO: 3.95 X10E6/UL (ref 3.77–5.28)
SODIUM SERPL-SCNC: 141 MMOL/L (ref 134–144)
TSH SERPL DL<=0.005 MIU/L-ACNC: 1.11 UIU/ML (ref 0.45–4.5)
WBC # BLD AUTO: 6.9 X10E3/UL (ref 3.4–10.8)

## 2025-06-22 LAB — IGF-I SERPL-MCNC: 213 NG/ML (ref 84–281)

## 2025-06-23 ENCOUNTER — RESULTS FOLLOW-UP (OUTPATIENT)
Age: 30
End: 2025-06-23

## 2025-06-23 NOTE — RESULT ENCOUNTER NOTE
Letter: all of your labs are within acceptable limits.  Keep your routinely scheduled appts with PCP. Have a great start to the week.

## 2025-08-13 ENCOUNTER — OFFICE VISIT (OUTPATIENT)
Age: 30
End: 2025-08-13

## 2025-08-13 VITALS
WEIGHT: 118 LBS | HEART RATE: 103 BPM | BODY MASS INDEX: 23.83 KG/M2 | RESPIRATION RATE: 20 BRPM | TEMPERATURE: 98.9 F | DIASTOLIC BLOOD PRESSURE: 78 MMHG | OXYGEN SATURATION: 98 % | SYSTOLIC BLOOD PRESSURE: 109 MMHG

## 2025-08-13 DIAGNOSIS — U07.1 COVID-19: Primary | ICD-10-CM

## 2025-08-13 DIAGNOSIS — R52 BODY ACHES: ICD-10-CM

## 2025-08-13 LAB
INFLUENZA A ANTIGEN, POC: NEGATIVE
INFLUENZA B ANTIGEN, POC: NEGATIVE
Lab: ABNORMAL
PERFORMING INSTRUMENT: ABNORMAL
QC PASS/FAIL: ABNORMAL
SARS-COV-2, POC: DETECTED